# Patient Record
Sex: FEMALE | Race: WHITE | NOT HISPANIC OR LATINO | Employment: OTHER | URBAN - METROPOLITAN AREA
[De-identification: names, ages, dates, MRNs, and addresses within clinical notes are randomized per-mention and may not be internally consistent; named-entity substitution may affect disease eponyms.]

---

## 2019-04-01 ENCOUNTER — TELEPHONE (OUTPATIENT)
Dept: NEUROSURGERY | Facility: CLINIC | Age: 69
End: 2019-04-01

## 2019-04-01 NOTE — TELEPHONE ENCOUNTER
----- Message from Karly Spring sent at 4/1/2019  1:53 PM CDT -----  Contact: self @ [phone]  Pt says she had back surgery with Dr Gilmore back in the 90's at [facility].  Pt is calling to schedule.  Would like to know if he will still see her for her back even though he is only seeing brain now.  Pls call.

## 2019-06-03 ENCOUNTER — OFFICE VISIT (OUTPATIENT)
Dept: NEUROSURGERY | Facility: CLINIC | Age: 69
End: 2019-06-03
Payer: MEDICARE

## 2019-06-03 VITALS
SYSTOLIC BLOOD PRESSURE: 136 MMHG | TEMPERATURE: 98 F | WEIGHT: 246 LBS | DIASTOLIC BLOOD PRESSURE: 60 MMHG | HEART RATE: 63 BPM

## 2019-06-03 DIAGNOSIS — M96.1 LUMBAR POST-LAMINECTOMY SYNDROME: ICD-10-CM

## 2019-06-03 DIAGNOSIS — G89.4 CHRONIC PAIN SYNDROME: ICD-10-CM

## 2019-06-03 PROCEDURE — 99204 OFFICE O/P NEW MOD 45 MIN: CPT | Mod: S$PBB,,, | Performed by: NEUROLOGICAL SURGERY

## 2019-06-03 PROCEDURE — 99213 OFFICE O/P EST LOW 20 MIN: CPT | Mod: PBBFAC | Performed by: NEUROLOGICAL SURGERY

## 2019-06-03 PROCEDURE — 99999 PR PBB SHADOW E&M-EST. PATIENT-LVL III: ICD-10-PCS | Mod: PBBFAC,,, | Performed by: NEUROLOGICAL SURGERY

## 2019-06-03 PROCEDURE — 99999 PR PBB SHADOW E&M-EST. PATIENT-LVL III: CPT | Mod: PBBFAC,,, | Performed by: NEUROLOGICAL SURGERY

## 2019-06-03 PROCEDURE — 99204 PR OFFICE/OUTPT VISIT, NEW, LEVL IV, 45-59 MIN: ICD-10-PCS | Mod: S$PBB,,, | Performed by: NEUROLOGICAL SURGERY

## 2019-06-03 RX ORDER — ATORVASTATIN CALCIUM 20 MG/1
20 TABLET, FILM COATED ORAL DAILY
Refills: 1 | COMMUNITY
Start: 2019-05-19 | End: 2019-07-26

## 2019-06-03 RX ORDER — MIRABEGRON 50 MG/1
TABLET, FILM COATED, EXTENDED RELEASE ORAL
Refills: 1 | COMMUNITY
Start: 2019-04-01 | End: 2019-07-26

## 2019-06-03 RX ORDER — METOPROLOL SUCCINATE 50 MG/1
50 TABLET, EXTENDED RELEASE ORAL DAILY
Refills: 1 | COMMUNITY
Start: 2019-05-01 | End: 2019-07-26

## 2019-06-03 RX ORDER — OXYCODONE AND ACETAMINOPHEN 10; 325 MG/1; MG/1
1 TABLET ORAL 4 TIMES DAILY PRN
Refills: 0 | COMMUNITY
Start: 2019-04-01 | End: 2019-10-14

## 2019-06-03 RX ORDER — MONTELUKAST SODIUM 10 MG/1
10 TABLET ORAL DAILY
Refills: 1 | COMMUNITY
Start: 2019-04-19 | End: 2019-07-26

## 2019-06-03 RX ORDER — TRIAMCINOLONE ACETONIDE 1 MG/G
CREAM TOPICAL
COMMUNITY
Start: 2016-01-16 | End: 2019-06-17

## 2019-06-03 RX ORDER — SULFAMETHOXAZOLE AND TRIMETHOPRIM 800; 160 MG/1; MG/1
TABLET ORAL
COMMUNITY
End: 2019-06-17

## 2019-06-03 RX ORDER — LORATADINE 10 MG/1
10 TABLET ORAL DAILY
Refills: 0 | COMMUNITY
Start: 2019-03-21 | End: 2019-06-17

## 2019-06-03 RX ORDER — MIRTAZAPINE 15 MG/1
15 TABLET, FILM COATED ORAL DAILY
Refills: 2 | COMMUNITY
Start: 2019-02-24 | End: 2019-06-03 | Stop reason: SDUPTHER

## 2019-06-03 RX ORDER — LEVOTHYROXINE SODIUM 100 UG/1
100 TABLET ORAL DAILY
Refills: 1 | COMMUNITY
Start: 2019-05-10 | End: 2019-07-26

## 2019-06-03 RX ORDER — LISINOPRIL 10 MG/1
20 TABLET ORAL DAILY
Refills: 1 | COMMUNITY
Start: 2019-04-19 | End: 2019-07-26

## 2019-06-03 RX ORDER — RIZATRIPTAN BENZOATE 10 MG/1
TABLET ORAL
COMMUNITY
End: 2019-06-17

## 2019-06-03 RX ORDER — OXYBUTYNIN CHLORIDE 10 MG/1
10 TABLET, EXTENDED RELEASE ORAL DAILY
Refills: 0 | COMMUNITY
Start: 2019-05-13 | End: 2019-06-17

## 2019-06-03 RX ORDER — AZELASTINE 1 MG/ML
1 SPRAY, METERED NASAL DAILY
COMMUNITY
Start: 2019-06-01 | End: 2019-07-26

## 2019-06-03 RX ORDER — PANTOPRAZOLE SODIUM 40 MG/1
40 TABLET, DELAYED RELEASE ORAL DAILY
Refills: 1 | COMMUNITY
Start: 2019-05-16 | End: 2019-07-26

## 2019-06-03 RX ORDER — MIRTAZAPINE 15 MG/1
TABLET, FILM COATED ORAL
COMMUNITY
End: 2019-06-17

## 2019-06-03 RX ORDER — SOLIFENACIN SUCCINATE 10 MG/1
5 TABLET, FILM COATED ORAL
COMMUNITY
End: 2019-06-17

## 2019-06-03 RX ORDER — FLUTICASONE PROPIONATE 50 MCG
2 SPRAY, SUSPENSION (ML) NASAL NIGHTLY
COMMUNITY
Start: 2019-06-02 | End: 2019-07-26

## 2019-06-03 RX ORDER — FUROSEMIDE 20 MG/1
20 TABLET ORAL DAILY
COMMUNITY
Start: 2015-10-30

## 2019-06-03 NOTE — H&P (VIEW-ONLY)
History & Physical    SUBJECTIVE:     Chief Complaint:  Chronic back pain and leg pain.    History of Present Illness:  Ms. Mccabe is a 68-year-old female who was referred to me by Dr. Juan Manuel Mcconnell.  Her past medical history is significant for arthritis, hyperlipidemia, and hypertension.  She complains of a longstanding history of low back pain and leg pain.  Her back pain is equal to her leg pain.  Her leg pain goes down the posterior aspect of her legs bilaterally.  Many years ago she had an intrathecal catheter and pain pump placed for this chronic pain.  She states that her pain has been well controlled with the pain pump for many years.  She has undergone 2 revisions of the pump.  This 2nd revision was in July of 2016.  During this revision, the Medtronic intrathecal pump was replaced with a Flowonix pump.  The patient states that since the new pump was placed she has never had as good pain relief and that the pump never worked right.  In October of 2008 she had withdrawal symptoms and after with the withdrawal symptoms resolved she noticed that she no longer had any pain relief at all.  She is here today to discuss pump revision and/or replacement.    Review of patient's allergies indicates:   Allergen Reactions    Morphine Anaphylaxis    Morpholine analogues Itching, Nausea And Vomiting, Nausea Only, Palpitations, Shortness Of Breath and Swelling    Codeine        Current Outpatient Medications   Medication Sig Dispense Refill    atorvastatin (LIPITOR) 20 MG tablet Take 20 mg by mouth once daily.  1    azelastine (ASTELIN) 137 mcg (0.1 %) nasal spray       fluticasone propionate (FLONASE) 50 mcg/actuation nasal spray       furosemide (LASIX) 20 MG tablet       levothyroxine (SYNTHROID) 100 MCG tablet Take 100 mcg by mouth once daily.  1    lisinopril 10 MG tablet Take 20 mg by mouth once daily.  1    loratadine (CLARITIN) 10 mg tablet Take 10 mg by mouth once daily.  0    metoprolol succinate  (TOPROL-XL) 50 MG 24 hr tablet Take 50 mg by mouth once daily.  1    mirtazapine (REMERON) 15 MG tablet Remeron 15 mg tablet   Take 1 tablet every day by oral route at bedtime for 30 days.      montelukast (SINGULAIR) 10 mg tablet Take 10 mg by mouth once daily.  1    MYRBETRIQ 50 mg Tb24 TAKE 1 TABLET BY MOUTH SWALLOWING THE WHOLE TABLET WITH WATER ONCE DAILY  1    oxybutynin (DITROPAN-XL) 10 MG 24 hr tablet Take 10 mg by mouth once daily.  0    oxyCODONE-acetaminophen (PERCOCET)  mg per tablet Take 1 tablet by mouth 4 (four) times daily as needed.  0    pantoprazole (PROTONIX) 40 MG tablet Take 40 mg by mouth once daily.  1    rizatriptan (MAXALT) 10 MG tablet rizatriptan 10 mg tablet      solifenacin (VESICARE) 10 MG tablet Take 5 mg by mouth.      sulfamethoxazole-trimethoprim 800-160mg (BACTRIM DS) 800-160 mg Tab sulfamethoxazole 800 mg-trimethoprim 160 mg tablet      triamcinolone acetonide 0.1% (KENALOG) 0.1 % cream        No current facility-administered medications for this visit.        Past Medical History:   Diagnosis Date    Arthritis     Hyperlipidemia     Hypertension      Past Surgical History:   Procedure Laterality Date    CHOLECYSTECTOMY      GASTRIC RESTRICTION SURGERY      HYSTERECTOMY      KNEE SURGERY Right     neck fusion       SPINE SURGERY      TONSILLECTOMY       Family History     None        Social History     Socioeconomic History    Marital status:      Spouse name: Not on file    Number of children: Not on file    Years of education: Not on file    Highest education level: Not on file   Occupational History    Not on file   Social Needs    Financial resource strain: Not on file    Food insecurity:     Worry: Not on file     Inability: Not on file    Transportation needs:     Medical: Not on file     Non-medical: Not on file   Tobacco Use    Smoking status: Never Smoker    Smokeless tobacco: Never Used   Substance and Sexual Activity     Alcohol use: Never     Frequency: Never    Drug use: Never    Sexual activity: Not Currently   Lifestyle    Physical activity:     Days per week: Not on file     Minutes per session: Not on file    Stress: Not on file   Relationships    Social connections:     Talks on phone: Not on file     Gets together: Not on file     Attends Jainism service: Not on file     Active member of club or organization: Not on file     Attends meetings of clubs or organizations: Not on file     Relationship status: Not on file   Other Topics Concern    Not on file   Social History Narrative    Not on file       Review of Systems:  Review of Systems   Constitutional: Positive for diaphoresis, fatigue and unexpected weight change. Negative for activity change and fever.   HENT: Positive for tinnitus. Negative for hearing loss, nosebleeds and trouble swallowing.    Eyes: Positive for visual disturbance.   Respiratory: Positive for shortness of breath. Negative for cough and wheezing.    Cardiovascular: Positive for palpitations. Negative for chest pain.   Gastrointestinal: Positive for constipation. Negative for abdominal distention, abdominal pain, blood in stool, diarrhea, nausea and vomiting.   Endocrine: Negative for cold intolerance, heat intolerance and polydipsia.   Genitourinary: Negative for difficulty urinating, dysuria, frequency and urgency.   Musculoskeletal: Positive for back pain. Negative for gait problem, joint swelling, myalgias, neck pain and neck stiffness.   Skin: Negative for color change, rash and wound.   Allergic/Immunologic: Negative for environmental allergies and food allergies.   Neurological: Positive for numbness and headaches. Negative for dizziness, seizures, facial asymmetry, speech difficulty, weakness and light-headedness.   Hematological: Bruises/bleeds easily.   Psychiatric/Behavioral: Negative for agitation, behavioral problems, dysphoric mood and hallucinations. The patient is not  nervous/anxious.        OBJECTIVE:     Vital Signs  Temp: 97.6 °F (36.4 °C)  Pulse: 63  BP: 136/60  Pain Score:   6  Weight: 111.6 kg (246 lb)  There is no height or weight on file to calculate BMI.      Physical Exam:  Physical Exam:  Vitals reviewed.    Constitutional: She appears well-developed and well-nourished. No distress.     Eyes: Pupils are equal, round, and reactive to light. Conjunctivae and EOM are normal.     Cardiovascular: Normal rate, regular rhythm, normal pulses and no edema.     Abdominal: Soft. Bowel sounds are normal.     Skin: Skin displays no rash on trunk and no rash on extremities. Skin displays no lesions on trunk and no lesions on extremities.     Psych/Behavior: She is alert. She is oriented to person, place, and time. She has a normal mood and affect.     Musculoskeletal: Gait is normal.        Neck: Range of motion is full. There is no tenderness. Muscle strength is 5/5. Tone is normal.        Back: Range of motion is full. There is no tenderness. Muscle strength is 5/5. Tone is normal.        Right Upper Extremities: Range of motion is full. There is no tenderness. Muscle strength is 5/5. Tone is normal.        Left Upper Extremities: Range of motion is full. There is no tenderness. Muscle strength is 5/5. Tone is normal.       Right Lower Extremities: Range of motion is full. There is no tenderness. Muscle strength is 5/5. Tone is normal.        Left Lower Extremities: Range of motion is full. There is no tenderness. Muscle strength is 5/5. Tone is normal.     Neurological:        Coordination: She has a normal Romberg Test, normal finger to nose coordination and normal tandem walking coordination.        Sensory: There is no sensory deficit in the trunk. There is no sensory deficit in the extremities.        DTRs: DTRs are DTRS NORMAL AND SYMMETRICnormal and symmetric. She displays no Babinski's sign on the right side. She displays no Babinski's sign on the left side.        Cranial  nerves: Cranial nerve(s) II, III, IV, V, VI, VII, VIII, IX, X, XI and XII are intact.         Diagnostic Results:  She has no updated imaging studies available for review.    ASSESSMENT/PLAN:     Ms. Mccabe is a 68-year-old female with chronic pain.  She had an intrathecal catheter and pump placed many years ago for the pain.  She was doing well until July 2016 when her Medtronic pump was replaced with a Flowonix pump.  This pump never worked as well as the Medtronic pump.  Furthermore, it appears that she had a catheter issue in October of 2018 where the catheter is no longer functional.  She went through a period of withdrawal and now the pump does not work for her at all.  My plan is to replace the entire intrathecal catheter and pump system.  We will switch back to a Medtronic pump.  We will stay with a 20 mL pump given the fact that her drug is compound in needs to be changed every 3 months.  She also complains that the pump is physically to high in her abdomen.  We will plan revising the pump pocket and moving the pump more caudally.  Explained the procedure in detail to the patient as well as the risks and benefits and present cons.  The patient wishes to proceed at this time.  We will get all the appropriate preoperative testing and schedule surgery in the near future.  She knows she can call with any further questions or concerns in the meantime.        Note dictated with voice recognition software, please excuse any grammatical errors.

## 2019-06-03 NOTE — PROGRESS NOTES
History & Physical    SUBJECTIVE:     Chief Complaint:  Chronic back pain and leg pain.    History of Present Illness:  Ms. Mccabe is a 68-year-old female who was referred to me by Dr. Juan Manuel Mcconnell.  Her past medical history is significant for arthritis, hyperlipidemia, and hypertension.  She complains of a longstanding history of low back pain and leg pain.  Her back pain is equal to her leg pain.  Her leg pain goes down the posterior aspect of her legs bilaterally.  Many years ago she had an intrathecal catheter and pain pump placed for this chronic pain.  She states that her pain has been well controlled with the pain pump for many years.  She has undergone 2 revisions of the pump.  This 2nd revision was in July of 2016.  During this revision, the Medtronic intrathecal pump was replaced with a Flowonix pump.  The patient states that since the new pump was placed she has never had as good pain relief and that the pump never worked right.  In October of 2008 she had withdrawal symptoms and after with the withdrawal symptoms resolved she noticed that she no longer had any pain relief at all.  She is here today to discuss pump revision and/or replacement.    Review of patient's allergies indicates:   Allergen Reactions    Morphine Anaphylaxis    Morpholine analogues Itching, Nausea And Vomiting, Nausea Only, Palpitations, Shortness Of Breath and Swelling    Codeine        Current Outpatient Medications   Medication Sig Dispense Refill    atorvastatin (LIPITOR) 20 MG tablet Take 20 mg by mouth once daily.  1    azelastine (ASTELIN) 137 mcg (0.1 %) nasal spray       fluticasone propionate (FLONASE) 50 mcg/actuation nasal spray       furosemide (LASIX) 20 MG tablet       levothyroxine (SYNTHROID) 100 MCG tablet Take 100 mcg by mouth once daily.  1    lisinopril 10 MG tablet Take 20 mg by mouth once daily.  1    loratadine (CLARITIN) 10 mg tablet Take 10 mg by mouth once daily.  0    metoprolol succinate  (TOPROL-XL) 50 MG 24 hr tablet Take 50 mg by mouth once daily.  1    mirtazapine (REMERON) 15 MG tablet Remeron 15 mg tablet   Take 1 tablet every day by oral route at bedtime for 30 days.      montelukast (SINGULAIR) 10 mg tablet Take 10 mg by mouth once daily.  1    MYRBETRIQ 50 mg Tb24 TAKE 1 TABLET BY MOUTH SWALLOWING THE WHOLE TABLET WITH WATER ONCE DAILY  1    oxybutynin (DITROPAN-XL) 10 MG 24 hr tablet Take 10 mg by mouth once daily.  0    oxyCODONE-acetaminophen (PERCOCET)  mg per tablet Take 1 tablet by mouth 4 (four) times daily as needed.  0    pantoprazole (PROTONIX) 40 MG tablet Take 40 mg by mouth once daily.  1    rizatriptan (MAXALT) 10 MG tablet rizatriptan 10 mg tablet      solifenacin (VESICARE) 10 MG tablet Take 5 mg by mouth.      sulfamethoxazole-trimethoprim 800-160mg (BACTRIM DS) 800-160 mg Tab sulfamethoxazole 800 mg-trimethoprim 160 mg tablet      triamcinolone acetonide 0.1% (KENALOG) 0.1 % cream        No current facility-administered medications for this visit.        Past Medical History:   Diagnosis Date    Arthritis     Hyperlipidemia     Hypertension      Past Surgical History:   Procedure Laterality Date    CHOLECYSTECTOMY      GASTRIC RESTRICTION SURGERY      HYSTERECTOMY      KNEE SURGERY Right     neck fusion       SPINE SURGERY      TONSILLECTOMY       Family History     None        Social History     Socioeconomic History    Marital status:      Spouse name: Not on file    Number of children: Not on file    Years of education: Not on file    Highest education level: Not on file   Occupational History    Not on file   Social Needs    Financial resource strain: Not on file    Food insecurity:     Worry: Not on file     Inability: Not on file    Transportation needs:     Medical: Not on file     Non-medical: Not on file   Tobacco Use    Smoking status: Never Smoker    Smokeless tobacco: Never Used   Substance and Sexual Activity     Alcohol use: Never     Frequency: Never    Drug use: Never    Sexual activity: Not Currently   Lifestyle    Physical activity:     Days per week: Not on file     Minutes per session: Not on file    Stress: Not on file   Relationships    Social connections:     Talks on phone: Not on file     Gets together: Not on file     Attends Yazidi service: Not on file     Active member of club or organization: Not on file     Attends meetings of clubs or organizations: Not on file     Relationship status: Not on file   Other Topics Concern    Not on file   Social History Narrative    Not on file       Review of Systems:  Review of Systems   Constitutional: Positive for diaphoresis, fatigue and unexpected weight change. Negative for activity change and fever.   HENT: Positive for tinnitus. Negative for hearing loss, nosebleeds and trouble swallowing.    Eyes: Positive for visual disturbance.   Respiratory: Positive for shortness of breath. Negative for cough and wheezing.    Cardiovascular: Positive for palpitations. Negative for chest pain.   Gastrointestinal: Positive for constipation. Negative for abdominal distention, abdominal pain, blood in stool, diarrhea, nausea and vomiting.   Endocrine: Negative for cold intolerance, heat intolerance and polydipsia.   Genitourinary: Negative for difficulty urinating, dysuria, frequency and urgency.   Musculoskeletal: Positive for back pain. Negative for gait problem, joint swelling, myalgias, neck pain and neck stiffness.   Skin: Negative for color change, rash and wound.   Allergic/Immunologic: Negative for environmental allergies and food allergies.   Neurological: Positive for numbness and headaches. Negative for dizziness, seizures, facial asymmetry, speech difficulty, weakness and light-headedness.   Hematological: Bruises/bleeds easily.   Psychiatric/Behavioral: Negative for agitation, behavioral problems, dysphoric mood and hallucinations. The patient is not  nervous/anxious.        OBJECTIVE:     Vital Signs  Temp: 97.6 °F (36.4 °C)  Pulse: 63  BP: 136/60  Pain Score:   6  Weight: 111.6 kg (246 lb)  There is no height or weight on file to calculate BMI.      Physical Exam:  Physical Exam:  Vitals reviewed.    Constitutional: She appears well-developed and well-nourished. No distress.     Eyes: Pupils are equal, round, and reactive to light. Conjunctivae and EOM are normal.     Cardiovascular: Normal rate, regular rhythm, normal pulses and no edema.     Abdominal: Soft. Bowel sounds are normal.     Skin: Skin displays no rash on trunk and no rash on extremities. Skin displays no lesions on trunk and no lesions on extremities.     Psych/Behavior: She is alert. She is oriented to person, place, and time. She has a normal mood and affect.     Musculoskeletal: Gait is normal.        Neck: Range of motion is full. There is no tenderness. Muscle strength is 5/5. Tone is normal.        Back: Range of motion is full. There is no tenderness. Muscle strength is 5/5. Tone is normal.        Right Upper Extremities: Range of motion is full. There is no tenderness. Muscle strength is 5/5. Tone is normal.        Left Upper Extremities: Range of motion is full. There is no tenderness. Muscle strength is 5/5. Tone is normal.       Right Lower Extremities: Range of motion is full. There is no tenderness. Muscle strength is 5/5. Tone is normal.        Left Lower Extremities: Range of motion is full. There is no tenderness. Muscle strength is 5/5. Tone is normal.     Neurological:        Coordination: She has a normal Romberg Test, normal finger to nose coordination and normal tandem walking coordination.        Sensory: There is no sensory deficit in the trunk. There is no sensory deficit in the extremities.        DTRs: DTRs are DTRS NORMAL AND SYMMETRICnormal and symmetric. She displays no Babinski's sign on the right side. She displays no Babinski's sign on the left side.        Cranial  nerves: Cranial nerve(s) II, III, IV, V, VI, VII, VIII, IX, X, XI and XII are intact.         Diagnostic Results:  She has no updated imaging studies available for review.    ASSESSMENT/PLAN:     Ms. Mccabe is a 68-year-old female with chronic pain.  She had an intrathecal catheter and pump placed many years ago for the pain.  She was doing well until July 2016 when her Medtronic pump was replaced with a Flowonix pump.  This pump never worked as well as the Medtronic pump.  Furthermore, it appears that she had a catheter issue in October of 2018 where the catheter is no longer functional.  She went through a period of withdrawal and now the pump does not work for her at all.  My plan is to replace the entire intrathecal catheter and pump system.  We will switch back to a Medtronic pump.  We will stay with a 20 mL pump given the fact that her drug is compound in needs to be changed every 3 months.  She also complains that the pump is physically to high in her abdomen.  We will plan revising the pump pocket and moving the pump more caudally.  Explained the procedure in detail to the patient as well as the risks and benefits and present cons.  The patient wishes to proceed at this time.  We will get all the appropriate preoperative testing and schedule surgery in the near future.  She knows she can call with any further questions or concerns in the meantime.        Note dictated with voice recognition software, please excuse any grammatical errors.

## 2019-06-05 ENCOUNTER — TELEPHONE (OUTPATIENT)
Dept: NEUROSURGERY | Facility: CLINIC | Age: 69
End: 2019-06-05

## 2019-06-05 DIAGNOSIS — M96.1 POST-LAMINECTOMY SYNDROME: ICD-10-CM

## 2019-06-05 DIAGNOSIS — G89.4 CHRONIC PAIN SYNDROME: Primary | ICD-10-CM

## 2019-06-07 ENCOUNTER — PATIENT MESSAGE (OUTPATIENT)
Dept: SURGERY | Facility: HOSPITAL | Age: 69
End: 2019-06-07

## 2019-06-12 ENCOUNTER — TELEPHONE (OUTPATIENT)
Dept: PAIN MEDICINE | Facility: CLINIC | Age: 69
End: 2019-06-12

## 2019-06-12 NOTE — TELEPHONE ENCOUNTER
----- Message from Faisal Banerjee sent at 6/12/2019 11:55 AM CDT -----  Contact: self   Patient want to know if Dr sees pain pump patients, please call back at [phone] (home)

## 2019-06-12 NOTE — TELEPHONE ENCOUNTER
Spoke with patient and informed her we do not manage pain pumps. Sent her an e-mail of other doctors in the area to try.

## 2019-06-17 ENCOUNTER — TELEPHONE (OUTPATIENT)
Dept: NEUROSURGERY | Facility: CLINIC | Age: 69
End: 2019-06-17

## 2019-06-17 ENCOUNTER — ANESTHESIA EVENT (OUTPATIENT)
Dept: SURGERY | Facility: HOSPITAL | Age: 69
DRG: 042 | End: 2019-06-17
Payer: MEDICARE

## 2019-06-17 RX ORDER — ASPIRIN 81 MG/1
81 TABLET ORAL DAILY
Status: ON HOLD | COMMUNITY
End: 2019-06-18 | Stop reason: HOSPADM

## 2019-06-17 NOTE — PRE-PROCEDURE INSTRUCTIONS
Preop instructions: NPO solids/ milk products after midnight and clears up to 4:30am  (clear liquids are: water, apple juice, Gatorade & Jell-O, black coffee/no milk, cream or creamer), shower instructions, directions, leave all valuables at home, medication instructions for PM prior & am of procedure explained. Patient stated an understanding.     Patient denies any side effects or issues with anesthesia or sedation.

## 2019-06-17 NOTE — TELEPHONE ENCOUNTER
----- Message from Cristofer Ash sent at 6/17/2019  8:07 AM CDT -----  Contact: Patient @ [phone]  Patient calling to confirm if all pre op information and surgerial clearance were received,  pls call

## 2019-06-17 NOTE — ANESTHESIA PREPROCEDURE EVALUATION
"                                                                                                             06/17/2019  [facility]  Anesthesia Pre-Operative Evaluation         Patient Name: Karen Mccabe  YOB: 1950  MRN: [id number]    SUBJECTIVE:     Pre-operative evaluation for Procedure(s) (LRB):  Insertion, Intrathecal Pump (N/A)     06/17/2019    Karen Mccabe is a 68 y.o. female w/ a significant PMHx of HTN, HLD, GERD, arthritis, lumbar post laminectomy syndrome and chronic pain syndrome.    Patient now presents for the above procedure(s).      LDA: None documented.       Prev airway: None documented.    Drips: None documented.      Patient Active Problem List   Diagnosis    Chronic pain syndrome    Lumbar post-laminectomy syndrome       Review of patient's allergies indicates:   Allergen Reactions    Morphine Anaphylaxis    Versed [midazolam] Hallucinations     "went crazy"    Codeine Hives and Rash       Current Inpatient Medications:      No current facility-administered medications on file prior to encounter.      Current Outpatient Medications on File Prior to Encounter   Medication Sig Dispense Refill    aspirin (ECOTRIN) 81 MG EC tablet Take 81 mg by mouth once daily.      atorvastatin (LIPITOR) 20 MG tablet Take 20 mg by mouth once daily.  1    azelastine (ASTELIN) 137 mcg (0.1 %) nasal spray 1 spray by Nasal route once daily.       fluticasone propionate (FLONASE) 50 mcg/actuation nasal spray 2 sprays every evening.       furosemide (LASIX) 20 MG tablet Take 20 mg by mouth once daily.       levothyroxine (SYNTHROID) 100 MCG tablet Take 100 mcg by mouth once daily.  1    lisinopril 10 MG tablet Take 20 mg by mouth once daily.  1    metoprolol succinate (TOPROL-XL) 50 MG 24 hr tablet Take 50 mg by mouth once daily.  1    montelukast (SINGULAIR) 10 mg tablet Take 10 mg by mouth once daily.  1    MYRBETRIQ 50 mg Tb24 TAKE 1 TABLET BY " MOUTH SWALLOWING THE WHOLE TABLET WITH WATER ONCE DAILY  1    omega-3/dha/epa/dpa/fish oil (OMEGA-3 2100 ORAL) Take by mouth once daily.      oxyCODONE-acetaminophen (PERCOCET)  mg per tablet Take 1 tablet by mouth 4 (four) times daily as needed.  0    pantoprazole (PROTONIX) 40 MG tablet Take 40 mg by mouth once daily.  1       Past Surgical History:   Procedure Laterality Date    CHOLECYSTECTOMY      GASTRIC RESTRICTION SURGERY      HYSTERECTOMY      KNEE SURGERY Right     neck fusion       SPINE SURGERY      TONSILLECTOMY         Social History     Socioeconomic History    Marital status:      Spouse name: Not on file    Number of children: Not on file    Years of education: Not on file    Highest education level: Not on file   Occupational History    Not on file   Social Needs    Financial resource strain: Not on file    Food insecurity:     Worry: Not on file     Inability: Not on file    Transportation needs:     Medical: Not on file     Non-medical: Not on file   Tobacco Use    Smoking status: Never Smoker    Smokeless tobacco: Never Used   Substance and Sexual Activity    Alcohol use: Never     Frequency: Never    Drug use: Never    Sexual activity: Not Currently   Lifestyle    Physical activity:     Days per week: Not on file     Minutes per session: Not on file    Stress: Not on file   Relationships    Social connections:     Talks on phone: Not on file     Gets together: Not on file     Attends [employer] service: Not on file     Active member of club or organization: Not on file     Attends meetings of clubs or organizations: Not on file     Relationship status: Not on file   Other Topics Concern    Not on file   Social History Narrative    Not on file       OBJECTIVE:     Vital Signs Range (Last 24H):         Significant Labs:  No results found for: WBC, HGB, HCT, PLT, CHOL, TRIG, HDL, LDLDIRECT, ALT, AST, NA, K, CL, CREATININE, BUN, CO2, TSH, PSA, INR, GLUF,  HGBA1C, MICROALBUR    Diagnostic Studies: No relevant studies.    EKG: No recent studies available.    2D ECHO:  No results found for this or any previous visit.      ASSESSMENT/PLAN:         Anesthesia Evaluation    I have reviewed the Patient Summary Reports.    I have reviewed the Nursing Notes.      Review of Systems  Anesthesia Hx:  History of prior surgery of interest to airway management or planning: Denies Family Hx of Anesthesia complications.   Denies Personal Hx of Anesthesia complications.   Social:  Non-Smoker    Cardiovascular:   Hypertension Denies MI.  Denies CAD.       Pulmonary:   Denies COPD.  Denies Asthma.    Hepatic/GI:   GERD    Musculoskeletal:   Arthritis   Spine Disorders: lumbar Chronic Pain    Neurological:   Denies CVA. Denies Seizures.    Endocrine:   Hypothyroidism        Physical Exam  General:  Well nourished       Chest/Lungs:  Chest/Lungs Clear    Heart/Vascular:  Heart Findings: Normal Heart murmur: negative       Mental Status:  Mental Status Findings: Normal        Anesthesia Plan  Type of Anesthesia, risks & benefits discussed:  Anesthesia Type:  general  Patient's Preference:   Intra-op Monitoring Plan: standard ASA monitors  Intra-op Monitoring Plan Comments:   Post Op Pain Control Plan: multimodal analgesia, IV/PO Opioids PRN and per primary service following discharge from PACU  Post Op Pain Control Plan Comments:   Induction:   IV  Beta Blocker:  Patient is not currently on a Beta-Blocker (No further documentation required).       Informed Consent: Patient understands risks and agrees with Anesthesia plan.  Questions answered. Anesthesia consent signed with patient.  ASA Score: 2     Day of Surgery Review of History & Physical: I have interviewed and examined the patient. I have reviewed the patient's H&P dated:    H&P update referred to the provider.         Ready For Surgery From Anesthesia Perspective.

## 2019-06-18 ENCOUNTER — ANESTHESIA (OUTPATIENT)
Dept: SURGERY | Facility: HOSPITAL | Age: 69
DRG: 042 | End: 2019-06-18
Payer: MEDICARE

## 2019-06-18 ENCOUNTER — HOSPITAL ENCOUNTER (INPATIENT)
Facility: HOSPITAL | Age: 69
LOS: 1 days | Discharge: HOME | DRG: 042 | End: 2019-06-18
Attending: NEUROLOGICAL SURGERY | Admitting: NEUROLOGICAL SURGERY
Payer: MEDICARE

## 2019-06-18 DIAGNOSIS — G89.4 CHRONIC PAIN SYNDROME: ICD-10-CM

## 2019-06-18 DIAGNOSIS — M96.1 LUMBAR POST-LAMINECTOMY SYNDROME: Primary | ICD-10-CM

## 2019-06-18 LAB
ABO + RH BLD: NORMAL
ALBUMIN SERPL BCP-MCNC: 3.3 G/DL (ref 3.5–5.2)
ALP SERPL-CCNC: 109 U/L (ref 55–135)
ALT SERPL W/O P-5'-P-CCNC: 32 U/L (ref 10–44)
ANION GAP SERPL CALC-SCNC: 9 MMOL/L (ref 8–16)
APTT BLDCRRT: 24.9 SEC (ref 21–32)
AST SERPL-CCNC: 35 U/L (ref 10–40)
BASOPHILS # BLD AUTO: 0.06 K/UL (ref 0–0.2)
BASOPHILS NFR BLD: 0.8 % (ref 0–1.9)
BILIRUB SERPL-MCNC: 0.7 MG/DL (ref 0.1–1)
BLD GP AB SCN CELLS X3 SERPL QL: NORMAL
BUN SERPL-MCNC: 12 MG/DL (ref 8–23)
CALCIUM SERPL-MCNC: 9.7 MG/DL (ref 8.7–10.5)
CHLORIDE SERPL-SCNC: 104 MMOL/L (ref 95–110)
CO2 SERPL-SCNC: 28 MMOL/L (ref 23–29)
CREAT SERPL-MCNC: 0.8 MG/DL (ref 0.5–1.4)
DIFFERENTIAL METHOD: NORMAL
EOSINOPHIL # BLD AUTO: 0.1 K/UL (ref 0–0.5)
EOSINOPHIL NFR BLD: 1.7 % (ref 0–8)
ERYTHROCYTE [DISTWIDTH] IN BLOOD BY AUTOMATED COUNT: 13.6 % (ref 11.5–14.5)
EST. GFR  (AFRICAN AMERICAN): >60 ML/MIN/1.73 M^2
EST. GFR  (NON AFRICAN AMERICAN): >60 ML/MIN/1.73 M^2
GLUCOSE SERPL-MCNC: 108 MG/DL (ref 70–110)
HBV SURFACE AB SER-ACNC: NEGATIVE M[IU]/ML
HBV SURFACE AG SERPL QL IA: NEGATIVE
HCT VFR BLD AUTO: 42.4 % (ref 37–48.5)
HGB BLD-MCNC: 13.7 G/DL (ref 12–16)
HIV 1+2 AB+HIV1 P24 AG SERPL QL IA: NEGATIVE
HIV1+2 IGG SERPL QL IA.RAPID: NEGATIVE
IMM GRANULOCYTES # BLD AUTO: 0.02 K/UL (ref 0–0.04)
IMM GRANULOCYTES NFR BLD AUTO: 0.3 % (ref 0–0.5)
INR PPP: 1.1 (ref 0.8–1.2)
LYMPHOCYTES # BLD AUTO: 2 K/UL (ref 1–4.8)
LYMPHOCYTES NFR BLD: 26.5 % (ref 18–48)
MCH RBC QN AUTO: 29.7 PG (ref 27–31)
MCHC RBC AUTO-ENTMCNC: 32.3 G/DL (ref 32–36)
MCV RBC AUTO: 92 FL (ref 82–98)
MONOCYTES # BLD AUTO: 0.9 K/UL (ref 0.3–1)
MONOCYTES NFR BLD: 11.6 % (ref 4–15)
NEUTROPHILS # BLD AUTO: 4.5 K/UL (ref 1.8–7.7)
NEUTROPHILS NFR BLD: 59.1 % (ref 38–73)
NRBC BLD-RTO: 0 /100 WBC
PLATELET # BLD AUTO: 181 K/UL (ref 150–350)
PMV BLD AUTO: 11.8 FL (ref 9.2–12.9)
POTASSIUM SERPL-SCNC: 3.8 MMOL/L (ref 3.5–5.1)
PROT SERPL-MCNC: 7 G/DL (ref 6–8.4)
PROTHROMBIN TIME: 10.8 SEC (ref 9–12.5)
RBC # BLD AUTO: 4.61 M/UL (ref 4–5.4)
SODIUM SERPL-SCNC: 141 MMOL/L (ref 136–145)
WBC # BLD AUTO: 7.61 K/UL (ref 3.9–12.7)

## 2019-06-18 PROCEDURE — 86703 HIV-1/HIV-2 1 RESULT ANTBDY: CPT | Mod: 91

## 2019-06-18 PROCEDURE — 86901 BLOOD TYPING SEROLOGIC RH(D): CPT

## 2019-06-18 PROCEDURE — 62350 PR IMP SPINAL CANAL CATH: ICD-10-PCS | Mod: ,,, | Performed by: NEUROLOGICAL SURGERY

## 2019-06-18 PROCEDURE — 12000002 HC ACUTE/MED SURGE SEMI-PRIVATE ROOM

## 2019-06-18 PROCEDURE — 87536 HIV-1 QUANT&REVRSE TRNSCRPJ: CPT

## 2019-06-18 PROCEDURE — D9220A PRA ANESTHESIA: ICD-10-PCS | Mod: ,,, | Performed by: ANESTHESIOLOGY

## 2019-06-18 PROCEDURE — 85610 PROTHROMBIN TIME: CPT

## 2019-06-18 PROCEDURE — 62350 IMPLANT SPINAL CANAL CATH: CPT | Mod: ,,, | Performed by: NEUROLOGICAL SURGERY

## 2019-06-18 PROCEDURE — 86706 HEP B SURFACE ANTIBODY: CPT

## 2019-06-18 PROCEDURE — 25000003 PHARM REV CODE 250: Performed by: NEUROLOGICAL SURGERY

## 2019-06-18 PROCEDURE — 63600175 PHARM REV CODE 636 W HCPCS: Performed by: NEUROLOGICAL SURGERY

## 2019-06-18 PROCEDURE — 62362 PR INSERT/ REPLACE INFUSN PUMP,PROGRAMMABLE: ICD-10-PCS | Mod: 51,,, | Performed by: NEUROLOGICAL SURGERY

## 2019-06-18 PROCEDURE — C1755 CATHETER, INTRASPINAL: HCPCS | Performed by: NEUROLOGICAL SURGERY

## 2019-06-18 PROCEDURE — 71000033 HC RECOVERY, INTIAL HOUR: Performed by: NEUROLOGICAL SURGERY

## 2019-06-18 PROCEDURE — D9220A PRA ANESTHESIA: Mod: ,,, | Performed by: ANESTHESIOLOGY

## 2019-06-18 PROCEDURE — 85025 COMPLETE CBC W/AUTO DIFF WBC: CPT

## 2019-06-18 PROCEDURE — 87340 HEPATITIS B SURFACE AG IA: CPT

## 2019-06-18 PROCEDURE — C1772 INFUSION PUMP, PROGRAMMABLE: HCPCS | Performed by: NEUROLOGICAL SURGERY

## 2019-06-18 PROCEDURE — 86703 HIV-1/HIV-2 1 RESULT ANTBDY: CPT

## 2019-06-18 PROCEDURE — 63600175 PHARM REV CODE 636 W HCPCS: Performed by: STUDENT IN AN ORGANIZED HEALTH CARE EDUCATION/TRAINING PROGRAM

## 2019-06-18 PROCEDURE — 80053 COMPREHEN METABOLIC PANEL: CPT

## 2019-06-18 PROCEDURE — 37000009 HC ANESTHESIA EA ADD 15 MINS: Performed by: NEUROLOGICAL SURGERY

## 2019-06-18 PROCEDURE — 87522 HEPATITIS C REVRS TRNSCRPJ: CPT

## 2019-06-18 PROCEDURE — 36000706: Performed by: NEUROLOGICAL SURGERY

## 2019-06-18 PROCEDURE — 25000003 PHARM REV CODE 250: Performed by: STUDENT IN AN ORGANIZED HEALTH CARE EDUCATION/TRAINING PROGRAM

## 2019-06-18 PROCEDURE — 37000008 HC ANESTHESIA 1ST 15 MINUTES: Performed by: NEUROLOGICAL SURGERY

## 2019-06-18 PROCEDURE — 62362 IMPLANT SPINE INFUSION PUMP: CPT | Mod: 51,,, | Performed by: NEUROLOGICAL SURGERY

## 2019-06-18 PROCEDURE — 71000039 HC RECOVERY, EACH ADD'L HOUR: Performed by: NEUROLOGICAL SURGERY

## 2019-06-18 PROCEDURE — 36000707: Performed by: NEUROLOGICAL SURGERY

## 2019-06-18 PROCEDURE — 71000015 HC POSTOP RECOV 1ST HR: Performed by: NEUROLOGICAL SURGERY

## 2019-06-18 PROCEDURE — 85730 THROMBOPLASTIN TIME PARTIAL: CPT

## 2019-06-18 PROCEDURE — 27201423 OPTIME MED/SURG SUP & DEVICES STERILE SUPPLY: Performed by: NEUROLOGICAL SURGERY

## 2019-06-18 DEVICE — PUMP PAIN CNTRL INF 40ML: Type: IMPLANTABLE DEVICE | Site: ABDOMEN | Status: FUNCTIONAL

## 2019-06-18 DEVICE — CATH ASCENDA 86.4X114.3 4FR: Type: IMPLANTABLE DEVICE | Site: ABDOMEN | Status: FUNCTIONAL

## 2019-06-18 RX ORDER — IPRATROPIUM BROMIDE AND ALBUTEROL SULFATE 2.5; .5 MG/3ML; MG/3ML
3 SOLUTION RESPIRATORY (INHALATION) EVERY 4 HOURS PRN
Status: DISCONTINUED | OUTPATIENT
Start: 2019-06-18 | End: 2019-06-18 | Stop reason: HOSPADM

## 2019-06-18 RX ORDER — LIDOCAINE HYDROCHLORIDE AND EPINEPHRINE 10; 10 MG/ML; UG/ML
INJECTION, SOLUTION INFILTRATION; PERINEURAL
Status: DISCONTINUED | OUTPATIENT
Start: 2019-06-18 | End: 2019-06-18 | Stop reason: HOSPADM

## 2019-06-18 RX ORDER — SODIUM CHLORIDE 0.9 % (FLUSH) 0.9 %
10 SYRINGE (ML) INJECTION
Status: DISCONTINUED | OUTPATIENT
Start: 2019-06-18 | End: 2019-06-18 | Stop reason: HOSPADM

## 2019-06-18 RX ORDER — ONDANSETRON 2 MG/ML
INJECTION INTRAMUSCULAR; INTRAVENOUS
Status: DISCONTINUED | OUTPATIENT
Start: 2019-06-18 | End: 2019-06-18

## 2019-06-18 RX ORDER — LIDOCAINE HCL/PF 100 MG/5ML
SYRINGE (ML) INTRAVENOUS
Status: DISCONTINUED | OUTPATIENT
Start: 2019-06-18 | End: 2019-06-18

## 2019-06-18 RX ORDER — ROCURONIUM BROMIDE 10 MG/ML
INJECTION, SOLUTION INTRAVENOUS
Status: DISCONTINUED | OUTPATIENT
Start: 2019-06-18 | End: 2019-06-18

## 2019-06-18 RX ORDER — FLUTICASONE PROPIONATE 110 UG/1
1 AEROSOL, METERED RESPIRATORY (INHALATION) 2 TIMES DAILY
COMMUNITY
End: 2019-07-26

## 2019-06-18 RX ORDER — ACETAMINOPHEN 10 MG/ML
INJECTION, SOLUTION INTRAVENOUS
Status: DISCONTINUED | OUTPATIENT
Start: 2019-06-18 | End: 2019-06-18

## 2019-06-18 RX ORDER — CEPHALEXIN 500 MG/1
500 CAPSULE ORAL EVERY 12 HOURS
Qty: 10 CAPSULE | Refills: 0 | Status: SHIPPED | OUTPATIENT
Start: 2019-06-18 | End: 2019-06-23

## 2019-06-18 RX ORDER — FENTANYL CITRATE 50 UG/ML
INJECTION, SOLUTION INTRAMUSCULAR; INTRAVENOUS
Status: DISCONTINUED | OUTPATIENT
Start: 2019-06-18 | End: 2019-06-18

## 2019-06-18 RX ORDER — PROPOFOL 10 MG/ML
VIAL (ML) INTRAVENOUS
Status: DISCONTINUED | OUTPATIENT
Start: 2019-06-18 | End: 2019-06-18

## 2019-06-18 RX ORDER — OXYCODONE AND ACETAMINOPHEN 5; 325 MG/1; MG/1
1 TABLET ORAL EVERY 4 HOURS PRN
Qty: 25 TABLET | Refills: 0 | Status: SHIPPED | OUTPATIENT
Start: 2019-06-18 | End: 2019-06-23

## 2019-06-18 RX ORDER — SODIUM CHLORIDE 9 MG/ML
INJECTION, SOLUTION INTRAVENOUS CONTINUOUS
Status: DISCONTINUED | OUTPATIENT
Start: 2019-06-18 | End: 2019-06-18 | Stop reason: HOSPADM

## 2019-06-18 RX ORDER — ALBUTEROL SULFATE 0.63 MG/3ML
0.63 SOLUTION RESPIRATORY (INHALATION) EVERY 6 HOURS PRN
COMMUNITY
End: 2019-07-26

## 2019-06-18 RX ORDER — BACITRACIN ZINC 500 UNIT/G
OINTMENT (GRAM) TOPICAL
Status: DISCONTINUED | OUTPATIENT
Start: 2019-06-18 | End: 2019-06-18 | Stop reason: HOSPADM

## 2019-06-18 RX ORDER — FENTANYL CITRATE 50 UG/ML
25 INJECTION, SOLUTION INTRAMUSCULAR; INTRAVENOUS EVERY 5 MIN PRN
Status: DISCONTINUED | OUTPATIENT
Start: 2019-06-18 | End: 2019-06-18 | Stop reason: HOSPADM

## 2019-06-18 RX ORDER — CEFAZOLIN SODIUM 1 G/3ML
INJECTION, POWDER, FOR SOLUTION INTRAMUSCULAR; INTRAVENOUS
Status: DISCONTINUED | OUTPATIENT
Start: 2019-06-18 | End: 2019-06-18

## 2019-06-18 RX ORDER — ALBUTEROL SULFATE 2.5 MG/.5ML
2.5 SOLUTION RESPIRATORY (INHALATION) EVERY 4 HOURS PRN
Status: DISCONTINUED | OUTPATIENT
Start: 2019-06-18 | End: 2019-06-18 | Stop reason: HOSPADM

## 2019-06-18 RX ORDER — LIDOCAINE HYDROCHLORIDE 10 MG/ML
INJECTION, SOLUTION EPIDURAL; INFILTRATION; INTRACAUDAL; PERINEURAL
Status: DISCONTINUED | OUTPATIENT
Start: 2019-06-18 | End: 2019-06-18 | Stop reason: HOSPADM

## 2019-06-18 RX ORDER — NEOSTIGMINE METHYLSULFATE 1 MG/ML
INJECTION, SOLUTION INTRAVENOUS
Status: DISCONTINUED | OUTPATIENT
Start: 2019-06-18 | End: 2019-06-18

## 2019-06-18 RX ORDER — KETOROLAC TROMETHAMINE 30 MG/ML
INJECTION, SOLUTION INTRAMUSCULAR; INTRAVENOUS
Status: DISCONTINUED | OUTPATIENT
Start: 2019-06-18 | End: 2019-06-18

## 2019-06-18 RX ORDER — LIDOCAINE HYDROCHLORIDE 10 MG/ML
1 INJECTION, SOLUTION EPIDURAL; INFILTRATION; INTRACAUDAL; PERINEURAL ONCE
Status: COMPLETED | OUTPATIENT
Start: 2019-06-18 | End: 2019-06-18

## 2019-06-18 RX ORDER — SUCCINYLCHOLINE CHLORIDE 20 MG/ML
INJECTION INTRAMUSCULAR; INTRAVENOUS
Status: DISCONTINUED | OUTPATIENT
Start: 2019-06-18 | End: 2019-06-18

## 2019-06-18 RX ORDER — DEXAMETHASONE SODIUM PHOSPHATE 4 MG/ML
INJECTION, SOLUTION INTRA-ARTICULAR; INTRALESIONAL; INTRAMUSCULAR; INTRAVENOUS; SOFT TISSUE
Status: DISCONTINUED | OUTPATIENT
Start: 2019-06-18 | End: 2019-06-18

## 2019-06-18 RX ORDER — PNV NO.95/FERROUS FUM/FOLIC AC 28MG-0.8MG
100 TABLET ORAL DAILY
COMMUNITY

## 2019-06-18 RX ORDER — BACITRACIN 50000 [IU]/1
INJECTION, POWDER, FOR SOLUTION INTRAMUSCULAR
Status: DISCONTINUED | OUTPATIENT
Start: 2019-06-18 | End: 2019-06-18 | Stop reason: HOSPADM

## 2019-06-18 RX ORDER — EPHEDRINE SULFATE 50 MG/ML
INJECTION, SOLUTION INTRAVENOUS
Status: DISCONTINUED | OUTPATIENT
Start: 2019-06-18 | End: 2019-06-18

## 2019-06-18 RX ORDER — GLYCOPYRROLATE 0.2 MG/ML
INJECTION INTRAMUSCULAR; INTRAVENOUS
Status: DISCONTINUED | OUTPATIENT
Start: 2019-06-18 | End: 2019-06-18

## 2019-06-18 RX ORDER — OXYCODONE AND ACETAMINOPHEN 5; 325 MG/1; MG/1
1 TABLET ORAL EVERY 4 HOURS PRN
Status: DISCONTINUED | OUTPATIENT
Start: 2019-06-18 | End: 2019-06-18 | Stop reason: HOSPADM

## 2019-06-18 RX ORDER — VANCOMYCIN HYDROCHLORIDE 1 G/20ML
INJECTION, POWDER, LYOPHILIZED, FOR SOLUTION INTRAVENOUS
Status: DISCONTINUED | OUTPATIENT
Start: 2019-06-18 | End: 2019-06-18 | Stop reason: HOSPADM

## 2019-06-18 RX ORDER — ONDANSETRON 2 MG/ML
4 INJECTION INTRAMUSCULAR; INTRAVENOUS DAILY PRN
Status: DISCONTINUED | OUTPATIENT
Start: 2019-06-18 | End: 2019-06-18 | Stop reason: HOSPADM

## 2019-06-18 RX ADMIN — DEXAMETHASONE SODIUM PHOSPHATE 4 MG: 4 INJECTION, SOLUTION INTRAMUSCULAR; INTRAVENOUS at 07:06

## 2019-06-18 RX ADMIN — SODIUM CHLORIDE, SODIUM GLUCONATE, SODIUM ACETATE, POTASSIUM CHLORIDE, MAGNESIUM CHLORIDE, SODIUM PHOSPHATE, DIBASIC, AND POTASSIUM PHOSPHATE: .53; .5; .37; .037; .03; .012; .00082 INJECTION, SOLUTION INTRAVENOUS at 08:06

## 2019-06-18 RX ADMIN — EPHEDRINE SULFATE 5 MG: 50 INJECTION, SOLUTION INTRAMUSCULAR; INTRAVENOUS; SUBCUTANEOUS at 08:06

## 2019-06-18 RX ADMIN — FENTANYL CITRATE 25 MCG: 50 INJECTION, SOLUTION INTRAMUSCULAR; INTRAVENOUS at 09:06

## 2019-06-18 RX ADMIN — OXYCODONE HYDROCHLORIDE AND ACETAMINOPHEN 1 TABLET: 5; 325 TABLET ORAL at 11:06

## 2019-06-18 RX ADMIN — PROPOFOL 40 MG: 10 INJECTION, EMULSION INTRAVENOUS at 08:06

## 2019-06-18 RX ADMIN — SODIUM CHLORIDE: 0.9 INJECTION, SOLUTION INTRAVENOUS at 06:06

## 2019-06-18 RX ADMIN — SUCCINYLCHOLINE CHLORIDE 60 MG: 20 INJECTION, SOLUTION INTRAMUSCULAR; INTRAVENOUS at 07:06

## 2019-06-18 RX ADMIN — PROPOFOL 40 MG: 10 INJECTION, EMULSION INTRAVENOUS at 10:06

## 2019-06-18 RX ADMIN — LIDOCAINE HYDROCHLORIDE 0.1 MG: 10 INJECTION, SOLUTION EPIDURAL; INFILTRATION; INTRACAUDAL; PERINEURAL at 06:06

## 2019-06-18 RX ADMIN — FENTANYL CITRATE 25 MCG: 50 INJECTION, SOLUTION INTRAMUSCULAR; INTRAVENOUS at 10:06

## 2019-06-18 RX ADMIN — SODIUM CHLORIDE: 0.9 INJECTION, SOLUTION INTRAVENOUS at 07:06

## 2019-06-18 RX ADMIN — PROPOFOL 40 MG: 10 INJECTION, EMULSION INTRAVENOUS at 07:06

## 2019-06-18 RX ADMIN — ROCURONIUM BROMIDE 10 MG: 10 INJECTION, SOLUTION INTRAVENOUS at 08:06

## 2019-06-18 RX ADMIN — FENTANYL CITRATE 100 MCG: 50 INJECTION, SOLUTION INTRAMUSCULAR; INTRAVENOUS at 07:06

## 2019-06-18 RX ADMIN — CEFAZOLIN 2 G: 330 INJECTION, POWDER, FOR SOLUTION INTRAMUSCULAR; INTRAVENOUS at 07:06

## 2019-06-18 RX ADMIN — PROPOFOL 40 MG: 10 INJECTION, EMULSION INTRAVENOUS at 09:06

## 2019-06-18 RX ADMIN — LIDOCAINE HYDROCHLORIDE 50 MG: 20 INJECTION, SOLUTION INTRAVENOUS at 07:06

## 2019-06-18 RX ADMIN — KETOROLAC TROMETHAMINE 30 MG: 30 INJECTION, SOLUTION INTRAMUSCULAR; INTRAVENOUS at 09:06

## 2019-06-18 RX ADMIN — FENTANYL CITRATE 50 MCG: 50 INJECTION, SOLUTION INTRAMUSCULAR; INTRAVENOUS at 08:06

## 2019-06-18 RX ADMIN — ROCURONIUM BROMIDE 25 MG: 10 INJECTION, SOLUTION INTRAVENOUS at 07:06

## 2019-06-18 RX ADMIN — PROPOFOL 150 MG: 10 INJECTION, EMULSION INTRAVENOUS at 07:06

## 2019-06-18 RX ADMIN — GLYCOPYRROLATE 0.6 MG: 0.2 INJECTION, SOLUTION INTRAMUSCULAR; INTRAVENOUS at 10:06

## 2019-06-18 RX ADMIN — NEOSTIGMINE METHYLSULFATE 5 MG: 1 INJECTION INTRAVENOUS at 10:06

## 2019-06-18 RX ADMIN — ROCURONIUM BROMIDE 5 MG: 10 INJECTION, SOLUTION INTRAVENOUS at 07:06

## 2019-06-18 RX ADMIN — ACETAMINOPHEN 1000 MG: 10 INJECTION, SOLUTION INTRAVENOUS at 07:06

## 2019-06-18 RX ADMIN — ONDANSETRON 4 MG: 2 INJECTION INTRAMUSCULAR; INTRAVENOUS at 10:06

## 2019-06-18 RX ADMIN — PROPOFOL 50 MG: 10 INJECTION, EMULSION INTRAVENOUS at 07:06

## 2019-06-18 RX ADMIN — SUCCINYLCHOLINE CHLORIDE 140 MG: 20 INJECTION, SOLUTION INTRAMUSCULAR; INTRAVENOUS at 07:06

## 2019-06-18 NOTE — DISCHARGE SUMMARY
[facility]  Neurosurgery  Discharge Summary      Patient Name: Karen Armstrong  MRN: [id number]  Admission Date: 6/18/2019  Hospital Length of Stay: 1 days  Discharge Date and Time:  06/18/2019 3:56 PM  Attending Physician: Ary Patel MD  Discharging Provider: Corky Jackson MD  Primary Care Physician: Primary Doctor No    HPI: Ms. Karen armstrong is a 69 yo female with a PMH of arthritis, hyperlipidemia, and hypertension with longstanding low back pain and leg pain.  Her back pain is equal to her leg pain.  Her leg pain goes down the posterior aspect of her legs bilaterally.  Many years ago she had an intrathecal catheter and pain pump placed for this chronic pain.  She states that her pain had been well controlled with the pain pump for many years.  She has undergone 2 revisions of the pump.  This 2nd revision was in July of 2016.  During this revision, the Medtronic intrathecal pump was replaced with a Flowonix pump.  The patient states that since the new pump was placed she has never had as good pain relief and that the pump never worked right.  In October of 2018 she had withdrawal symptoms and after with the withdrawal symptoms resolved she noticed that she no longer had any pain relief at all.  Her pain pump has not currently been working. Further she had recent conversation with her primary providers about increasing the quantity of medication with alteration of dosing, meaning she will now be able to tolerate a larger pump size for implantation.     Procedure(s) (LRB):  Insertion, Intrathecal Pump (N/A)     Indwelling Lines/Drains at time of discharge:  Lines/Drains/Airways          None          Hospital Course (synopsis of major diagnoses, care, treatment, and services provided during the course of the hospital stay): To OR today for intrathecal dilaudid pump removal and replacement.     Consults: none    Significant Diagnostic Studies: Labs:   BMP:   Recent Labs   Lab  06/18/19  0600         K 3.8      CO2 28   BUN 12   CREATININE 0.8   CALCIUM 9.7   , CMP   Recent Labs   Lab 06/18/19  0600      K 3.8      CO2 28      BUN 12   CREATININE 0.8   CALCIUM 9.7   PROT 7.0   ALBUMIN 3.3*   BILITOT 0.7   ALKPHOS 109   AST 35   ALT 32   ANIONGAP 9   ESTGFRAFRICA >60.0   EGFRNONAA >60.0    and CBC   Recent Labs   Lab 06/18/19  0600   WBC 7.61   HGB 13.7   HCT 42.4          Pending Diagnostic Studies:     Procedure Component Value Units Date/Time    HIV RNA, quantitative, PCR [[id number]] Collected:  06/18/19 1054    Order Status:  Sent Lab Status:  In process Updated:  06/18/19 1133    Specimen:  Blood     Hepatitis C RNA, quantitative, PCR [[id number]] Collected:  06/18/19 1054    Order Status:  Sent Lab Status:  In process Updated:  06/18/19 1133    Specimen:  Blood           Final Active Diagnoses:    Diagnosis Date Noted POA    PRINCIPAL PROBLEM:  Chronic pain syndrome [G89.4] 06/03/2019 Yes      Problems Resolved During this Admission:       Discharged Condition: good    Follow Up:  Wound care instructions were given  Aspirin is to be held post op  She will follow up in clinic in 2 weeks to neurosurgery clinic  Pain pump was filled with saline for later intended start date  Discharged on oral pain medicine and antibiotics (keflex) for 5days for surgical prophylaxis.     Patient Instructions:      Diet Adult Regular     Notify your health care provider if you experience any of the following:  increased confusion or weakness     Notify your health care provider if you experience any of the following:  persistent dizziness, light-headedness, or visual disturbances     Notify your health care provider if you experience any of the following:  worsening rash     Notify your health care provider if you experience any of the following:  severe persistent headache     Notify your health care provider if you experience any of the following:  difficulty  breathing or increased cough     Notify your health care provider if you experience any of the following:  redness, tenderness, or signs of infection (pain, swelling, redness, odor or green/yellow discharge around incision site)     Notify your health care provider if you experience any of the following:  severe uncontrolled pain     Notify your health care provider if you experience any of the following:  persistent nausea and vomiting or diarrhea     Notify your health care provider if you experience any of the following:  temperature >100.4     Remove dressing in 48 hours     Activity as tolerated     Medications:  Reconciled Home Medications:      Medication List      START taking these medications    cephALEXin 500 MG capsule  Commonly known as:  KEFLEX  Take 1 capsule (500 mg total) by mouth every 12 (twelve) hours. for 5 days        CHANGE how you take these medications    * oxyCODONE-acetaminophen  mg per tablet  Commonly known as:  PERCOCET  Take 1 tablet by mouth 4 (four) times daily as needed.  What changed:  Another medication with the same name was added. Make sure you understand how and when to take each.     * oxyCODONE-acetaminophen 5-325 mg per tablet  Commonly known as:  PERCOCET  Take 1 tablet by mouth every 4 (four) hours as needed (Pain 7-10/10).  What changed:  You were already taking a medication with the same name, and this prescription was added. Make sure you understand how and when to take each.         * This list has 2 medication(s) that are the same as other medications prescribed for you. Read the directions carefully, and ask your doctor or other care provider to review them with you.            CONTINUE taking these medications    albuterol 0.63 mg/3 mL Nebu  Commonly known as:  ACCUNEB  Take 0.63 mg by nebulization every 6 (six) hours as needed. Rescue     atorvastatin 20 MG tablet  Commonly known as:  LIPITOR  Take 20 mg by mouth once daily.     azelastine 137 mcg (0.1 %)  nasal spray  Commonly known as:  ASTELIN  1 spray by Nasal route once daily.     cyanocobalamin 100 MCG tablet  Commonly known as:  VITAMIN B-12  Take 100 mcg by mouth once daily.     * fluticasone propionate 110 mcg/actuation inhaler  Commonly known as:  FLOVENT HFA  Inhale 1 puff into the lungs 2 (two) times daily. Controller     * fluticasone propionate 50 mcg/actuation nasal spray  Commonly known as:  FLONASE  2 sprays every evening.     furosemide 20 MG tablet  Commonly known as:  LASIX  Take 20 mg by mouth once daily.     levothyroxine 100 MCG tablet  Commonly known as:  SYNTHROID  Take 100 mcg by mouth once daily.     lisinopril 10 MG tablet  Take 20 mg by mouth once daily.     LUTEIN ORAL  Take by mouth.     metoprolol succinate 50 MG 24 hr tablet  Commonly known as:  TOPROL-XL  Take 50 mg by mouth once daily.     montelukast 10 mg tablet  Commonly known as:  SINGULAIR  Take 10 mg by mouth once daily.     MYRBETRIQ 50 mg Tb24  Generic drug:  mirabegron  TAKE 1 TABLET BY MOUTH SWALLOWING THE WHOLE TABLET WITH WATER ONCE DAILY     OMEGA-3 2100 ORAL  Take by mouth once daily.     pantoprazole 40 MG tablet  Commonly known as:  PROTONIX  Take 40 mg by mouth once daily.         * This list has 2 medication(s) that are the same as other medications prescribed for you. Read the directions carefully, and ask your doctor or other care provider to review them with you.            STOP taking these medications    aspirin 81 MG EC tablet  Commonly known as:  ECOTRIN            Time spent on the discharge of patient: 30 minutes    Corky Jackson MD  Neurosurgery  [facility]

## 2019-06-18 NOTE — PLAN OF CARE
Pt resting comfortably.    Call light in reach.    No questions or concerns at this time.  Belongings sent home

## 2019-06-18 NOTE — ANESTHESIA POSTPROCEDURE EVALUATION
Anesthesia Post Evaluation    Patient: Karen Mccabe    Procedure(s) Performed: Procedure(s) (LRB):  Insertion, Intrathecal Pump (N/A)    Final Anesthesia Type: general  Patient location during evaluation: PACU  Patient participation: Yes- Able to Participate  Level of consciousness: awake and alert and oriented  Post-procedure vital signs: reviewed and stable  Pain management: adequate  Airway patency: patent  PONV status at discharge: No PONV  Anesthetic complications: no      Cardiovascular status: hemodynamically stable  Respiratory status: unassisted  Hydration status: euvolemic  Follow-up not needed.          Vitals Value Taken Time   /60 6/18/2019  1:02 PM   Temp 37.1 °C (98.8 °F) 6/18/2019  1:00 PM   Pulse 74 6/18/2019  1:13 PM   Resp 18 6/18/2019  1:00 PM   SpO2 97 % 6/18/2019  1:13 PM   Vitals shown include unvalidated device data.      Event Time     Out of Recovery 12:33:02          Pain/Adrien Score: Pain Rating Prior to Med Admin: 3 (6/18/2019 12:00 PM)  Pain Rating Post Med Admin: 3 (6/18/2019 12:00 PM)  Adrien Score: 10 (6/18/2019 12:00 PM)

## 2019-06-18 NOTE — INTERVAL H&P NOTE
The patient has been examined and the H&P has been reviewed:    I concur with the findings and no changes have occurred since H&P was written.  To OR today for intrathecal pump replacement/insertion  Patient consented and marked  Further orders to follow procedure    Anesthesia/Surgery risks, benefits and alternative options discussed and understood by patient/family.          Active Hospital Problems    Diagnosis  POA    Chronic pain syndrome [G89.4]  Yes      Resolved Hospital Problems   No resolved problems to display.

## 2019-06-18 NOTE — DISCHARGE INSTRUCTIONS
--Patient stable for discharge to Home    --Please take prescriptions as detailed in medication list    --All questions/concerns addressed and answered    --Please followup with neurosurgery clinic in 2 weeks for wound check; to be arranged by Neurosurgery Clinic     --Please call immediately for any new onset nausea/vomiting/fever/chills, wound breakdown, numbness/tingling/weakness    Wound Care Instructions:  -If you have any dressings at discharge, please remove 48 hours after the surgery.  -If you have steri strips, do not remove, as they will fall off.  -If you have staples, do not removed, as they will be removed at clinic follow up.  -You may shower daily but do not soak or submerge wound in water. Pat incision dry, do not rub.  -Keep all wounds clean, dry, and open to air.  -Do not apply creams or ointments to the wound.  -No driving while on narcotic pain medications  -If you were given a brace for spine surgery, please remove to shower, may remove while in bed, no driving, and must be worn for 6 weeks when out of bed.  -Call Neurosurgery if the wound opens, drains, or becomes red.

## 2019-06-18 NOTE — TRANSFER OF CARE
"Anesthesia Transfer of Care Note    Patient: Karen Mccabe    Procedure(s) Performed: Procedure(s) (LRB):  Insertion, Intrathecal Pump (N/A)    Patient location: PACU    Anesthesia Type: general    Transport from OR: Transported from OR on 6-10 L/min O2 by face mask with adequate spontaneous ventilation    Post pain: pain needs to be addressed    Post assessment: no apparent anesthetic complications and tolerated procedure well    Post vital signs: stable    Level of consciousness: awake    Complications: none    Transfer of care protocol was followed      Last vitals:   Visit Vitals  /61 (BP Location: Left arm, Patient Position: Lying)   Pulse 84   Temp 36 °C (96.8 °F) (Temporal)   Resp 17   Ht 5' 2" (1.575 m)   Wt 108.4 kg (239 lb)   SpO2 96%   Breastfeeding? No   BMI 43.71 kg/m²     "

## 2019-06-18 NOTE — BRIEF OP NOTE
[facility]  Brief Operative Note  Neurosurgery    SUMMARY     Surgery Date: 6/18/2019     Surgeon(s) and Role:     * Ary Patel MD - Primary     * Corky Jackson MD - Resident - Assisting        Pre-op Diagnosis:  Chronic pain syndrome [G89.4]  Post-laminectomy syndrome [M96.1]    Post-op Diagnosis: Post-Op Diagnosis Codes:     * Chronic pain syndrome [G89.4]     * Post-laminectomy syndrome [M96.1]     Procedure Performed: Removal of intrathecal pump; Insertion of intrathecal pump.     Procedure(s) (LRB):  Insertion, Intrathecal Pump (N/A)    Technical Procedures Used: Left lateral and posterior incisions, removal of prior pain pump and catheter, Lumbar puncture and fluoroscopic guided placement of intrathecal catheter to T9-10 level, tunneling of catheter and pump insertion to lateral pocket, internal closure.     Description of the findings of the procedure: See full op report    Estimated Blood Loss: 50cc         Specimens:   Specimen (12h ago, onward)    None

## 2019-06-19 ENCOUNTER — TELEPHONE (OUTPATIENT)
Dept: NEUROSURGERY | Facility: CLINIC | Age: 69
End: 2019-06-19

## 2019-06-19 VITALS
HEART RATE: 72 BPM | WEIGHT: 239 LBS | RESPIRATION RATE: 18 BRPM | SYSTOLIC BLOOD PRESSURE: 129 MMHG | TEMPERATURE: 99 F | OXYGEN SATURATION: 95 % | BODY MASS INDEX: 43.98 KG/M2 | DIASTOLIC BLOOD PRESSURE: 60 MMHG | HEIGHT: 62 IN

## 2019-06-19 NOTE — TELEPHONE ENCOUNTER
----- Message from Elsy Washington MA sent at 6/18/2019  4:51 PM CDT -----      ----- Message -----  From: Corky Jackson MD  Sent: 6/18/2019   4:01 PM  To: Amanda Mcallister Staff    Patient discharged from the hospital today after a same day surgery for dilaudid pain pump exchange of catheter and pump.  She will need follow up in 2 weeks for wound check.

## 2019-06-19 NOTE — OP NOTE
DATE OF PROCEDURE:  06/18/2019     PREOPERATIVE DIAGNOSIS:  Chronic pain syndrome, lumbar post-laminectomy syndrome, and intrathecal opioid pump malfunction.     POSTOPERATIVE DIAGNOSIS: Chronic pain syndrome, lumbar post-laminectomy syndrome, and intrathecal opioid pump malfunction.     OPERATIVE PROCEDURE:  Replacement intrathecal catheter as well as replacement of intrathecal opioid pump placement with programming.     SURGEON:  Ary Patel M.D.     ASSISTANT:  Corky Jackson M.D. (RES)     ANESTHESIA:  General.     ESTIMATED BLOOD LOSS:  10 mL     INDICATION FOR PROCEDURE:    is a 68-year-old female with   A longstanding history of low back pain and leg pain.  She had a lumbar fusion   as well as multiple epidural steroid injections which did not help her pain.    Ultimately, she had an intrathecal catheter as well as intrathecal opioid pump   placed for pain relief.  The patient did well for years.  In July of 2016, her Medtronic   pump was replaced with a Flowonix pump.  The patient states that since this   time she has never gotten great pain relief.  Furthermore in October of 2018   she began noticing withdrawal symptoms.  After the withdrawal symptoms past   she noticed that she got no relief from the pain pump any more.  It was thought   that there was a catheter failure.  Therefore, the decision was made to bring   her to the Operating Room for intrathecal catheter replacement and pump   replacement with the Medtronic pump.     OPERATIVE NOTE:  After obtaining informed consent, the patient was brought   into the Operating Room.  She was intubated and anesthetized by Anesthesia.    Preoperative antibiotics were given.  The patient was placed in the lateral   decubitus position with the right side down with her hips over the break in bed.    The bed was flexed, exposing the back and flank on the left side.  The back,   flank and abdomen were prepped and draped in the standard sterile fashion.     Skin incision was made first in the back using a #15 blade.  This was carried   down to the level of the lumbodorsal fascia using Bovie electrocautery.  The   patient's previous intrathecal catheter as well as anchoring Booty was identified   and freed from surrounding scar tissue.  The catheter was cut, a mosquito was   placed on the distal end of the catheter.  We observed for any spinal fluid from   the intrathecal portion of the catheter.  There was none.  Therefore the decision   was made to remove the intrathecal catheter and replaced it with a new one. The catheter would not come easily therefore the decision was made to leave the old catheter in the intrathecal space. The lumbodorsal fascia over where the catheter   was removed was closed with a 0 Vicryl in a pursestring fashion.  A new spinal   needle was introduced at the L2-L3 level and using anatomic landmarks as well   as fluoroscopic guidance was introduced into the intrathecal space.  Spinal fluid   was obtained.  We then passed the catheter up to approximately the T9 level.    The spinal needle and stylet were removed and the anchoring device was put into position and anchored down using 4-0 Nurolon.  We then turned our attention to  the abdomen.  Again, skin incision was made using the patient's previous incision   using a #15 blade.  A transverse incision was made in the left upper quadrant.  We dissected down to the pump pocket using Bovie electrocautery.  The pump was identified and elevated out of the pocket.  It was disconnected from the distal   portion of the intrathecal catheter.  The old pump was passed off the field as   Specimen.  We attempted to remove the remainder of the old catheter.  However,   the catheter would not come easily so we removed as much as we safely could.    The new catheter was then tunneled from the back to the abdomen.  The catheter   was cut to length, attached to the connection system and then to the pump.     The pump pocket was extended slightly caudally using Bovie electrocautery.    A new 40 mL pump was prepared on the back table.  It was loaded with preservative-free normal saline.  The new pump was placed back in the   pocket and anchored down using 2-0 silk sutures.  Both wounds were copiously  irrigated.  Vancomycin powder was placed in the wounds and the wounds were   closed in layers.  Sterile dressings were put in place.  The pump was programmed   to minimal rate. The final new catheter length was 104.1 cm.  The patient was   extubated by Anesthesia and brought to the Recovery Room in stable condition.    All counts were correct at the end of the case.

## 2019-06-20 ENCOUNTER — TELEPHONE (OUTPATIENT)
Dept: NEUROSURGERY | Facility: CLINIC | Age: 69
End: 2019-06-20

## 2019-06-20 NOTE — TELEPHONE ENCOUNTER
----- Message from Karly Londono sent at 6/20/2019  1:18 PM CDT -----  Contact: Tiffanie (sister) @ [phone]  Pt had a new pain pump put in on 6-18-19.  The medication in her old pain pump was not transferred to the new pump.  The new one was filled with saline.  Pt is in severe pain.  Would like to get the pump filled asap.  They found a Dr at [facility], Pain Management but they want to know what pain pump was put in and the name of the medication that was used in the pain pump.  Pls fax to [phone].

## 2019-06-21 LAB
HCV RNA SERPL NAA+PROBE-LOG IU: <1.08 LOG (10) IU/ML
HCV RNA SERPL QL NAA+PROBE: NOT DETECTED IU/ML
HCV RNA SPEC NAA+PROBE-ACNC: <12 IU/ML
HIV UQ DATE RECEIVED: NORMAL
HIV UQ DATE REPORTED: NORMAL
HIV1 RNA # SERPL NAA+PROBE: <40 COPIES/ML
HIV1 RNA SERPL NAA+PROBE-LOG#: <1.6 LOG (10) COPIES/ML
HIV1 RNA SERPL QL NAA+PROBE: NOT DETECTED

## 2019-07-26 ENCOUNTER — OFFICE VISIT (OUTPATIENT)
Dept: NEUROSURGERY | Facility: CLINIC | Age: 69
End: 2019-07-26
Payer: MEDICARE

## 2019-07-26 ENCOUNTER — TELEPHONE (OUTPATIENT)
Dept: NEUROSURGERY | Facility: CLINIC | Age: 69
End: 2019-07-26

## 2019-07-26 VITALS
DIASTOLIC BLOOD PRESSURE: 57 MMHG | HEART RATE: 75 BPM | SYSTOLIC BLOOD PRESSURE: 114 MMHG | BODY MASS INDEX: 42.34 KG/M2 | WEIGHT: 231.5 LBS

## 2019-07-26 DIAGNOSIS — G89.4 CHRONIC PAIN SYNDROME: Primary | ICD-10-CM

## 2019-07-26 DIAGNOSIS — M96.1 LUMBAR POST-LAMINECTOMY SYNDROME: ICD-10-CM

## 2019-07-26 PROCEDURE — 99024 PR POST-OP FOLLOW-UP VISIT: ICD-10-PCS | Mod: POP,,, | Performed by: NEUROLOGICAL SURGERY

## 2019-07-26 PROCEDURE — 99024 POSTOP FOLLOW-UP VISIT: CPT | Mod: POP,,, | Performed by: NEUROLOGICAL SURGERY

## 2019-07-26 PROCEDURE — 99213 OFFICE O/P EST LOW 20 MIN: CPT | Mod: PBBFAC | Performed by: NEUROLOGICAL SURGERY

## 2019-07-26 PROCEDURE — 99999 PR PBB SHADOW E&M-EST. PATIENT-LVL III: ICD-10-PCS | Mod: PBBFAC,,, | Performed by: NEUROLOGICAL SURGERY

## 2019-07-26 PROCEDURE — 99999 PR PBB SHADOW E&M-EST. PATIENT-LVL III: CPT | Mod: PBBFAC,,, | Performed by: NEUROLOGICAL SURGERY

## 2019-07-26 RX ORDER — MIRTAZAPINE 15 MG/1
TABLET, FILM COATED ORAL
COMMUNITY

## 2019-07-26 RX ORDER — RIZATRIPTAN BENZOATE 10 MG/1
TABLET, ORALLY DISINTEGRATING ORAL
COMMUNITY
End: 2019-10-14

## 2019-07-26 RX ORDER — PANTOPRAZOLE SODIUM 40 MG/1
TABLET, DELAYED RELEASE ORAL
COMMUNITY
End: 2019-10-14

## 2019-07-26 RX ORDER — AZELASTINE 1 MG/ML
SPRAY, METERED NASAL
COMMUNITY

## 2019-07-26 RX ORDER — LEVOTHYROXINE SODIUM 100 UG/1
TABLET ORAL
COMMUNITY
End: 2019-10-14

## 2019-07-26 RX ORDER — FLUTICASONE PROPIONATE 50 MCG
SPRAY, SUSPENSION (ML) NASAL
COMMUNITY

## 2019-07-26 RX ORDER — OXYBUTYNIN CHLORIDE 10 MG/1
TABLET, EXTENDED RELEASE ORAL
COMMUNITY

## 2019-07-26 RX ORDER — LISINOPRIL 10 MG/1
TABLET ORAL
COMMUNITY
End: 2019-10-14

## 2019-07-26 RX ORDER — ALBUTEROL SULFATE 90 UG/1
AEROSOL, METERED RESPIRATORY (INHALATION)
COMMUNITY
End: 2019-10-14

## 2019-07-26 RX ORDER — PHENAZOPYRIDINE HYDROCHLORIDE 200 MG/1
TABLET, FILM COATED ORAL
COMMUNITY
End: 2019-10-14

## 2019-07-26 RX ORDER — KETOCONAZOLE 20 MG/G
CREAM TOPICAL
COMMUNITY
End: 2019-10-14

## 2019-07-26 RX ORDER — MONTELUKAST SODIUM 10 MG/1
TABLET ORAL
COMMUNITY
End: 2019-10-14

## 2019-07-26 RX ORDER — OXYCODONE HYDROCHLORIDE 15 MG/1
TABLET ORAL
COMMUNITY
End: 2019-10-14

## 2019-07-26 RX ORDER — FLUOCINONIDE 0.5 MG/G
1 CREAM TOPICAL 2 TIMES DAILY
Refills: 2 | COMMUNITY
Start: 2019-07-06 | End: 2019-10-14

## 2019-07-26 RX ORDER — ATORVASTATIN CALCIUM 20 MG/1
TABLET, FILM COATED ORAL
COMMUNITY
End: 2019-10-14

## 2019-07-26 RX ORDER — METHYLPREDNISOLONE 4 MG/1
TABLET ORAL
Refills: 0 | COMMUNITY
Start: 2019-06-21

## 2019-07-26 RX ORDER — METOPROLOL SUCCINATE 50 MG/1
TABLET, EXTENDED RELEASE ORAL
COMMUNITY
End: 2019-10-14

## 2019-07-26 NOTE — PROGRESS NOTES
"Established Pateint    SUBJECTIVE:     History of Present Illness:  Ms. Mccabe is a 68-year-old female who is seeing me today in follow-up.  She was taken to the operating room on June 18, 2019 for replacement of an intrathecal catheter as well as opioid pump.  Preoperatively, she complained of a longstanding history of low back pain and leg pain.  She had a lumbar fusion as well as multiple epidural steroid injections which did not help her pain.  Ultimately, she had an intrathecal catheter as well as intrathecal opioid pump placed for pain relief.  She did well for years.  In July 2016, her Medtronic pump was replaced with a Flowonix pump.  The patient stated that since that time she had never gotten great pain relief.  Furthermore, in October 2018 she began noticing withdrawal symptoms.  After withdrawal symptoms passed, she noticed that she was no longer getting relief from the pain pump.  It was thought that there is a catheter failure.  Therefore, the decision was made to take the patient to the operating room for intrathecal catheter as well as opioid pump replacement with a Medtronic pump.  She is here today to see me in follow-up.  She reports no problems or complications postoperatively with the pump.  The pump repositioning seems to be much better for the patient.  She is seeing a pain management physician in [place] who has her on a very low dose of intrathecal Dilaudid.  The patient reports that this is not significantly relieving her pain.  She wishes to be referred to a local pain management physician for pump management.  She also reports a history of a left foot drop which has been present for 6 months and possible increasing right foot weakness.  She wishes to further investigate the reason for this.    Review of patient's allergies indicates:   Allergen Reactions    Morphine Anaphylaxis    Versed [midazolam] Hallucinations     "went crazy"    Codeine Hives and Rash       Current Outpatient " Medications   Medication Sig Dispense Refill    albuterol (VENTOLIN HFA) 90 mcg/actuation inhaler Ventolin HFA 90 mcg/actuation aerosol inhaler      atorvastatin (LIPITOR) 20 MG tablet atorvastatin 20 mg tablet      azelastine (ASTELIN) 137 mcg (0.1 %) nasal spray azelastine 137 mcg (0.1 %) nasal spray aerosol      cyanocobalamin (VITAMIN B-12) 100 MCG tablet Take 100 mcg by mouth once daily.      fluocinonide 0.05% (LIDEX) 0.05 % cream 1 application 2 (two) times daily. Apply to affected area  2    fluticasone propionate (FLONASE) 50 mcg/actuation nasal spray fluticasone propionate 50 mcg/actuation nasal spray,suspension      furosemide (LASIX) 20 MG tablet Take 20 mg by mouth once daily.       ketoconazole (NIZORAL) 2 % cream ketoconazole 2 % topical cream      levothyroxine (SYNTHROID) 100 MCG tablet levothyroxine 100 mcg tablet      lisinopril 10 MG tablet lisinopril 10 mg tablet      LUTEIN ORAL Take by mouth.      methylPREDNISolone (MEDROL DOSEPACK) 4 mg tablet TAKE 6 TABLETS ON DAY 1 AS DIRECTED ON PACKAGE AND DECREASE BY 1 TAB EACH DAY FOR A TOTAL OF 6 DAYS  0    metoprolol succinate (TOPROL-XL) 50 MG 24 hr tablet metoprolol succinate ER 50 mg tablet,extended release 24 hr      metroNIDAZOLE (NORITATE) 1 % cream metronidazole 1 % topical gel      mirabegron (MYRBETRIQ) 50 mg Tb24 Myrbetriq 50 mg tablet,extended release      mirtazapine (REMERON) 15 MG tablet mirtazapine 15 mg tablet      montelukast (SINGULAIR) 10 mg tablet montelukast 10 mg tablet      omega-3/dha/epa/dpa/fish oil (OMEGA-3 2100 ORAL) Take by mouth once daily.      oxyCODONE-acetaminophen (PERCOCET)  mg per tablet Take 1 tablet by mouth 4 (four) times daily as needed.  0    pantoprazole (PROTONIX) 40 MG tablet pantoprazole 40 mg tablet,delayed release      phenazopyridine (PYRIDIUM) 200 MG tablet phenazopyridine 200 mg tablet      rizatriptan (MAXALT-MLT) 10 MG disintegrating tablet rizatriptan 10 mg  disintegrating tablet      oxybutynin (DITROPAN-XL) 10 MG 24 hr tablet oxybutynin chloride ER 10 mg tablet,extended release 24 hr      oxyCODONE (ROXICODONE) 15 MG Tab oxycodone 15 mg tablet       No current facility-administered medications for this visit.        Past Medical History:   Diagnosis Date    Arthritis     Asthma     Encounter for blood transfusion     Hyperlipidemia     Hypertension     Thyroid disease      Past Surgical History:   Procedure Laterality Date    CHOLECYSTECTOMY      GASTRIC RESTRICTION SURGERY      HYSTERECTOMY      Insertion, Intrathecal Pump N/A 6/18/2019    Performed by Ary Patel MD at [facility] OR [address] [place] [address]    KNEE SURGERY Right     neck fusion       SPINE SURGERY      TONSILLECTOMY       Family History     None        Social History     Socioeconomic History    Marital status:      Spouse name: Not on file    Number of children: Not on file    Years of education: Not on file    Highest education level: Not on file   Occupational History    Not on file   Social Needs    Financial resource strain: Not on file    Food insecurity:     Worry: Not on file     Inability: Not on file    Transportation needs:     Medical: Not on file     Non-medical: Not on file   Tobacco Use    Smoking status: Never Smoker    Smokeless tobacco: Never Used   Substance and Sexual Activity    Alcohol use: Never     Frequency: Never    Drug use: Never    Sexual activity: Not Currently   Lifestyle    Physical activity:     Days per week: Not on file     Minutes per session: Not on file    Stress: Not on file   Relationships    Social connections:     Talks on phone: Not on file     Gets together: Not on file     Attends Orthodoxy service: Not on file     Active member of club or organization: Not on file     Attends meetings of clubs or organizations: Not on file     Relationship status: Not on file   Other Topics Concern    Not on file   Social History Narrative    Not on  file       Review of Systems:  Review of Systems    OBJECTIVE:     Vital Signs  Pulse: 75  BP: (!) 114/57  Pain Score:   8  Weight: 105 kg (231 lb 8 oz)  Body mass index is 42.34 kg/m².    Physical Exam:  Physical Exam:  Vitals reviewed.    Constitutional: She appears well-developed and well-nourished. No distress.     Eyes: Pupils are equal, round, and reactive to light. Conjunctivae and EOM are normal.     Cardiovascular: Normal rate, regular rhythm, normal pulses and no edema.     Abdominal: Soft. Bowel sounds are normal.     Skin: Skin displays no rash on trunk and no rash on extremities. Skin displays no lesions on trunk and no lesions on extremities.     Psych/Behavior: She is alert. She is oriented to person, place, and time. She has a normal mood and affect.     Musculoskeletal: Gait is normal.        Neck: Range of motion is full. There is no tenderness. Muscle strength is 5/5. Tone is normal.        Back: Range of motion is full. There is no tenderness. Muscle strength is 5/5. Tone is normal.        Right Upper Extremities: Range of motion is full. There is no tenderness. Muscle strength is 5/5. Tone is normal.        Left Upper Extremities: Range of motion is full. There is no tenderness. Muscle strength is 5/5. Tone is normal.       Right Lower Extremities: Range of motion is full. There is no tenderness. Muscle strength is 4/5. Tone is normal.        Left Lower Extremities: Range of motion is full. There is no tenderness. Muscle strength is 4/5. Tone is normal.     Neurological:        Coordination: She has a normal Romberg Test, normal finger to nose coordination and normal tandem walking coordination.        Sensory: There is no sensory deficit in the trunk. There is no sensory deficit in the extremities.        DTRs: DTRs are DTRS NORMAL AND SYMMETRICnormal and symmetric. She displays no Babinski's sign on the right side. She displays no Babinski's sign on the left side.        Cranial nerves:  Cranial nerve(s) II, III, IV, V, VI, VII, VIII, IX, X, XI and XII are intact.     Her wounds are well-healed.  There is a small scab over the left flank incision.    Diagnostic Results:  She has no updated imaging studies available for review.    ASSESSMENT/PLAN:     Ms. Mccabe is a 68-year-old female status post intrathecal catheter as well as opioid pump replacement.  Overall, she is doing well.  However, she feels that she is not getting as good of pain relief as she would like from her current pump settings.  Her pain management physician in [place] appears to be hesitant to make too many changes to the pump settings.  She would like a 2nd opinion into be managed by someone locally.  I have given her a referral to Dr. Gary Sifuentes for management of her intrathecal pain pump.  From a lumbar fusion standpoint and the patient's complains of her possible bilateral footdrop, I have recommended that she have her family physician order an updated MRI of the lumbar spine.  Once this is done she will bring the films back to my office for further treatment recommendations.  She knows she can call with any further questions or concerns in the meantime.        Note dictated with voice recognition software, please excuse any grammatical errors.

## 2019-07-26 NOTE — TELEPHONE ENCOUNTER
----- Message from Cristofer Ash sent at 7/26/2019 11:52 AM CDT -----  Contact: Patient @ [phone]  Patient calling to get the referral and the  op report fax to: Gary Sifuentes @ [phone]

## 2019-08-22 ENCOUNTER — PATIENT MESSAGE (OUTPATIENT)
Dept: NEUROSURGERY | Facility: CLINIC | Age: 69
End: 2019-08-22

## 2019-10-01 ENCOUNTER — PATIENT MESSAGE (OUTPATIENT)
Dept: NEUROSURGERY | Facility: CLINIC | Age: 69
End: 2019-10-01

## 2019-10-14 ENCOUNTER — OFFICE VISIT (OUTPATIENT)
Dept: NEUROSURGERY | Facility: CLINIC | Age: 69
End: 2019-10-14
Payer: MEDICARE

## 2019-10-14 VITALS
BODY MASS INDEX: 43.53 KG/M2 | DIASTOLIC BLOOD PRESSURE: 56 MMHG | SYSTOLIC BLOOD PRESSURE: 116 MMHG | WEIGHT: 238 LBS | HEART RATE: 67 BPM

## 2019-10-14 DIAGNOSIS — G89.4 CHRONIC PAIN SYNDROME: ICD-10-CM

## 2019-10-14 DIAGNOSIS — M96.1 LUMBAR POST-LAMINECTOMY SYNDROME: ICD-10-CM

## 2019-10-14 DIAGNOSIS — M21.372 LEFT FOOT DROP: Primary | ICD-10-CM

## 2019-10-14 PROCEDURE — 99213 OFFICE O/P EST LOW 20 MIN: CPT | Mod: PBBFAC | Performed by: NEUROLOGICAL SURGERY

## 2019-10-14 PROCEDURE — 99999 PR PBB SHADOW E&M-EST. PATIENT-LVL III: ICD-10-PCS | Mod: PBBFAC,,, | Performed by: NEUROLOGICAL SURGERY

## 2019-10-14 PROCEDURE — 99213 OFFICE O/P EST LOW 20 MIN: CPT | Mod: S$PBB,,, | Performed by: NEUROLOGICAL SURGERY

## 2019-10-14 PROCEDURE — 99213 PR OFFICE/OUTPT VISIT, EST, LEVL III, 20-29 MIN: ICD-10-PCS | Mod: S$PBB,,, | Performed by: NEUROLOGICAL SURGERY

## 2019-10-14 PROCEDURE — 99999 PR PBB SHADOW E&M-EST. PATIENT-LVL III: CPT | Mod: PBBFAC,,, | Performed by: NEUROLOGICAL SURGERY

## 2019-10-14 RX ORDER — AMITRIPTYLINE HYDROCHLORIDE 50 MG/1
50 TABLET, FILM COATED ORAL DAILY
Refills: 5 | COMMUNITY
Start: 2019-10-05

## 2019-10-14 RX ORDER — RIZATRIPTAN BENZOATE 10 MG/1
TABLET, ORALLY DISINTEGRATING ORAL
COMMUNITY

## 2019-10-14 RX ORDER — LISINOPRIL 10 MG/1
TABLET ORAL
COMMUNITY

## 2019-10-14 RX ORDER — OXYCODONE AND ACETAMINOPHEN 10; 325 MG/1; MG/1
TABLET ORAL
COMMUNITY

## 2019-10-14 RX ORDER — METRONIDAZOLE 10 MG/G
GEL TOPICAL
COMMUNITY

## 2019-10-14 RX ORDER — SULFAMETHOXAZOLE AND TRIMETHOPRIM 800; 160 MG/1; MG/1
1 TABLET ORAL 2 TIMES DAILY
Refills: 0 | COMMUNITY
Start: 2019-08-05

## 2019-10-14 RX ORDER — PANTOPRAZOLE SODIUM 40 MG/1
TABLET, DELAYED RELEASE ORAL
COMMUNITY

## 2019-10-14 RX ORDER — CYCLOSPORINE 0.5 MG/ML
1 EMULSION OPHTHALMIC 2 TIMES DAILY
Refills: 3 | COMMUNITY
Start: 2019-09-10

## 2019-10-14 RX ORDER — LEVOTHYROXINE SODIUM 100 UG/1
TABLET ORAL
COMMUNITY

## 2019-10-14 RX ORDER — ALBUTEROL SULFATE 90 UG/1
AEROSOL, METERED RESPIRATORY (INHALATION)
COMMUNITY

## 2019-10-14 RX ORDER — FLUOCINONIDE 0.5 MG/G
CREAM TOPICAL
COMMUNITY

## 2019-10-14 RX ORDER — MIRTAZAPINE 15 MG/1
TABLET, FILM COATED ORAL
COMMUNITY

## 2019-10-14 RX ORDER — KETOCONAZOLE 20 MG/G
CREAM TOPICAL
COMMUNITY

## 2019-10-14 RX ORDER — MONTELUKAST SODIUM 10 MG/1
TABLET ORAL
COMMUNITY

## 2019-10-14 RX ORDER — ATORVASTATIN CALCIUM 20 MG/1
TABLET, FILM COATED ORAL
COMMUNITY

## 2019-10-14 RX ORDER — PHENAZOPYRIDINE HYDROCHLORIDE 200 MG/1
TABLET, FILM COATED ORAL
COMMUNITY

## 2019-10-14 RX ORDER — METOPROLOL SUCCINATE 50 MG/1
TABLET, EXTENDED RELEASE ORAL
COMMUNITY

## 2019-10-14 NOTE — PROGRESS NOTES
Established Pateint    SUBJECTIVE:     History of Present Illness:  Ms. Mccabe is a 68-year-old female who is seeing me today in follow-up.   her last neurosurgery clinic appointment was on July 26, 2019.  She was taken to the operating room on June 18, 2019 for replacement of an intrathecal catheter as well as opioid pump.  Preoperatively, she complained of a longstanding history of low back pain and leg pain.  She had a lumbar fusion as well as multiple epidural steroid injections which did not help her pain.  Ultimately, she had an intrathecal catheter as well as intrathecal opioid pump placed for pain relief.  She did well for years.  In July 2016, her Medtronic pump was replaced with a Flowonix pump.  The patient stated that since that time she had never gotten great pain relief.  Furthermore, in October 2018 she began noticing withdrawal symptoms.  After withdrawal symptoms passed, she noticed that she was no longer getting relief from the pain pump.  It was thought that there is a catheter failure.  Therefore, the decision was made to take the patient to the operating room for intrathecal catheter as well as opioid pump replacement with a Medtronic pump. At the time of her last follow-up she reported no problems or complications postoperatively with the pump.  The pump repositioning seemed to be much better for the patient.  She was seeing a pain management physician in [place] who had her on a very low dose of intrathecal Dilaudid.  The patient reports that this was not significantly relieving her pain.  Therefore, I referred her to a pain management physician in [place].  She also complained of a progressive left foot drop at the time of her last appointment.  I had sent the patient for an MRI of the lumbar spine.  She is seeing me today in follow-up.  She is now seeing Dr. Sifuentes for pain management.  He has adjusted her pump and she states that she is getting great pain relief from the intrathecal  "opioid administration.  She is happy with the results.  She still complains of left foot weakness as well as increasing right foot weakness.  She is here today to follow-up with an updated MRI of the lumbar spine.    Review of patient's allergies indicates:   Allergen Reactions    Morphine Anaphylaxis    Versed [midazolam] Hallucinations     "went crazy"    Codeine Hives and Rash       Current Outpatient Medications   Medication Sig Dispense Refill    albuterol (VENTOLIN HFA) 90 mcg/actuation inhaler Ventolin HFA 90 mcg/actuation aerosol inhaler      amitriptyline (ELAVIL) 50 MG tablet Take 50 mg by mouth once daily.  5    atorvastatin (LIPITOR) 20 MG tablet atorvastatin 20 mg tablet      azelastine (ASTELIN) 137 mcg (0.1 %) nasal spray azelastine 137 mcg (0.1 %) nasal spray aerosol      cyanocobalamin (VITAMIN B-12) 100 MCG tablet Take 100 mcg by mouth once daily.      fluocinonide 0.05% (LIDEX) 0.05 % cream fluocinonide 0.05 % topical cream      fluticasone propionate (FLONASE) 50 mcg/actuation nasal spray fluticasone propionate 50 mcg/actuation nasal spray,suspension      furosemide (LASIX) 20 MG tablet Take 20 mg by mouth once daily.       ketoconazole (NIZORAL) 2 % cream ketoconazole 2 % topical cream      levothyroxine (SYNTHROID) 100 MCG tablet levothyroxine 100 mcg tablet      lisinopril 10 MG tablet lisinopril 10 mg tablet      LUTEIN ORAL Take by mouth.      methylPREDNISolone (MEDROL DOSEPACK) 4 mg tablet TAKE 6 TABLETS ON DAY 1 AS DIRECTED ON PACKAGE AND DECREASE BY 1 TAB EACH DAY FOR A TOTAL OF 6 DAYS  0    metoprolol succinate (TOPROL-XL) 50 MG 24 hr tablet metoprolol succinate ER 50 mg tablet,extended release 24 hr      metroNIDAZOLE (NORITATE) 1 % cream metronidazole 1 % topical gel      metronidazole 1% (METROGEL) 1 % Gel metronidazole 1 % topical gel      mirabegron (MYRBETRIQ) 50 mg Tb24 Myrbetriq 50 mg tablet,extended release      mirtazapine (REMERON) 15 MG tablet " mirtazapine 15 mg tablet      mirtazapine (REMERON) 15 MG tablet mirtazapine 15 mg tablet      montelukast (SINGULAIR) 10 mg tablet montelukast 10 mg tablet      omega-3/dha/epa/dpa/fish oil (OMEGA-3 2100 ORAL) Take by mouth once daily.      oxybutynin (DITROPAN-XL) 10 MG 24 hr tablet oxybutynin chloride ER 10 mg tablet,extended release 24 hr      oxyCODONE-acetaminophen (PERCOCET)  mg per tablet oxycodone-acetaminophen 10 mg-325 mg tablet      pantoprazole (PROTONIX) 40 MG tablet pantoprazole 40 mg tablet,delayed release      phenazopyridine (PYRIDIUM) 200 MG tablet phenazopyridine 200 mg tablet      RESTASIS 0.05 % ophthalmic emulsion Place 1 drop into both eyes 2 (two) times daily.  3    rizatriptan (MAXALT-MLT) 10 MG disintegrating tablet rizatriptan 10 mg disintegrating tablet      sulfamethoxazole-trimethoprim 800-160mg (BACTRIM DS) 800-160 mg Tab Take 1 tablet by mouth 2 (two) times daily.  0     No current facility-administered medications for this visit.        Past Medical History:   Diagnosis Date    Arthritis     Asthma     Encounter for blood transfusion     Hyperlipidemia     Hypertension     Thyroid disease      Past Surgical History:   Procedure Laterality Date    CHOLECYSTECTOMY      GASTRIC RESTRICTION SURGERY      HYSTERECTOMY      INTRATHECAL PUMP IMPLANTATION N/A 6/18/2019    Procedure: Insertion, Intrathecal Pump;  Surgeon: Ary Patel MD;  Location: [facility] OR [address] [place] [address];  Service: Neurosurgery;  Laterality: N/A;  1.5HOUR/23HOUR STAY/TYPE/HOLD/REGULAR BED SLIDERS/BEANBAG/LATERAL RIGHT DOWN/C-ARM/MEDTRONICS    KNEE SURGERY Right     neck fusion       SPINE SURGERY      TONSILLECTOMY       Family History     None        Social History     Socioeconomic History    Marital status:      Spouse name: Not on file    Number of children: Not on file    Years of education: Not on file    Highest education level: Not on file   Occupational History    Not on file    Social Needs    Financial resource strain: Not on file    Food insecurity:     Worry: Not on file     Inability: Not on file    Transportation needs:     Medical: Not on file     Non-medical: Not on file   Tobacco Use    Smoking status: Never Smoker    Smokeless tobacco: Never Used   Substance and Sexual Activity    Alcohol use: Never     Frequency: Never    Drug use: Never    Sexual activity: Not Currently   Lifestyle    Physical activity:     Days per week: Not on file     Minutes per session: Not on file    Stress: Not on file   Relationships    Social connections:     Talks on phone: Not on file     Gets together: Not on file     Attends Sabianist service: Not on file     Active member of club or organization: Not on file     Attends meetings of clubs or organizations: Not on file     Relationship status: Not on file   Other Topics Concern    Not on file   Social History Narrative    Not on file       Review of Systems:  Review of Systems    OBJECTIVE:     Vital Signs  Pulse: 67  BP: (!) 116/56  Pain Score:   6  Weight: 108 kg (238 lb)  Body mass index is 43.53 kg/m².    Physical Exam:  Physical Exam:    Constitutional: She appears well-developed and well-nourished. No distress.     Eyes: Pupils are equal, round, and reactive to light. Conjunctivae and EOM are normal.     Cardiovascular: Normal rate, regular rhythm, normal pulses and no edema.     Abdominal: Soft. Bowel sounds are normal.     Skin: Skin displays no rash on trunk and no rash on extremities. Skin displays no lesions on trunk and no lesions on extremities.     Psych/Behavior: She is alert. She is oriented to person, place, and time. She has a normal mood and affect.     Musculoskeletal: Gait is normal.        Neck: Range of motion is full. There is no tenderness. Muscle strength is 5/5. Tone is normal.        Back: Range of motion is full. There is no tenderness. Muscle strength is 5/5. Tone is normal.        Right Upper Extremities:  Range of motion is full. There is no tenderness. Muscle strength is 5/5. Tone is normal.        Left Upper Extremities: Range of motion is full. There is no tenderness. Muscle strength is 5/5. Tone is normal.       Right Lower Extremities: Range of motion is full. There is no tenderness. Muscle strength is 4/5. Tone is normal.        Left Lower Extremities: Range of motion is full. There is no tenderness. Muscle strength is 4/5. Tone is normal.     Neurological:        Coordination: She has a normal Romberg Test, normal finger to nose coordination and normal tandem walking coordination.        Sensory: There is no sensory deficit in the trunk. There is no sensory deficit in the extremities.        DTRs: DTRs are DTRS NORMAL AND SYMMETRICnormal and symmetric. She displays no Babinski's sign on the right side. She displays no Babinski's sign on the left side.        Cranial nerves: Cranial nerve(s) II, III, IV, V, VI, VII, VIII, IX, X, XI and XII are intact.         Diagnostic Results:  She has a report of the MRI of the lumbar spine available for review which I personally reviewed.  This shows a posterior decompression in lumbar spine from L2-S1.  There is a posterior lateral fusion from L2-S1.  There are interbody fusions at L2-3 and L3-4.  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    ASSESSMENT/PLAN:     Ms. Mccabe is a 69-year-old female status post intrathecal opioid pump revision.  She is doing great from a postoperative standpoint in from a pump management standpoint.  She will continue to follow with Dr. Sifuentes for pump adjustments and refills.  From a footdrop standpoint, I do not see anything on the report of the MRI of the lumbar spine to account for her bilateral foot drops.  I would like to see the imaging studies.  She will send this to my office.  I will call her in follow up with this.  Since there is not anything apparent on the MRI, I believe that an EMG of the lower extremities is  warranted.  I have given her an outside referral for this.  She will also send this to my office for review was completed.  Will call the patient in follow-up and will make a further treatment plan after review the MRI images as well as the EMG.  She knows she can call with any further questions or concerns.        Note dictated with voice recognition software, please excuse any grammatical errors.

## 2020-01-03 ENCOUNTER — TELEPHONE (OUTPATIENT)
Dept: NEUROSURGERY | Facility: CLINIC | Age: 70
End: 2020-01-03

## 2020-01-03 NOTE — TELEPHONE ENCOUNTER
----- Message from Mao Moore sent at 1/3/2020  9:14 AM CST -----  Contact: pt @ [phone]  Pt is asking if she can move appt on 03/02/20 to the end of February

## 2020-01-03 NOTE — TELEPHONE ENCOUNTER
Patient has been informed that Dr. Patel has no sooner appointments. Patient was informed that she will be placed on the cancellation list if a sooner appointment come available.

## 2020-03-05 ENCOUNTER — TELEPHONE (OUTPATIENT)
Dept: NEUROSURGERY | Facility: CLINIC | Age: 70
End: 2020-03-05

## 2020-03-05 DIAGNOSIS — S33.5XXS LUMBAR SPRAIN, SEQUELA: Primary | ICD-10-CM

## 2020-03-10 ENCOUNTER — TELEPHONE (OUTPATIENT)
Dept: NEUROSURGERY | Facility: CLINIC | Age: 70
End: 2020-03-10

## 2020-03-10 NOTE — TELEPHONE ENCOUNTER
----- Message from Elsy Washington MA sent at 3/5/2020  3:44 PM CST -----  Contact: Carlene (Dr. Mcconnell's office) @ [phone]      ----- Message -----  From: Cynthia Cam  Sent: 3/5/2020   9:52 AM CST  To: Amanda Mcallister Staff    Calling to speak with someone in Dr. Patel's office regarding what's needed for the patient's clearance. Please call, says patient is there now.

## 2020-05-13 ENCOUNTER — TELEPHONE (OUTPATIENT)
Dept: NEUROSURGERY | Facility: CLINIC | Age: 70
End: 2020-05-13

## 2020-05-13 NOTE — TELEPHONE ENCOUNTER
----- Message from Caitie Rm sent at 5/13/2020  9:58 AM CDT -----  Good morning,    Dr. Juan Manuel Mcconnell referring established patient for withintrathecal pain pump, patient suffered a fall causing catheter to come out, requesting repair or replacement. Please call patient. Referral & records scanned into media mgr.    Let me know if I can be of any further assistance,    Caitie Morris

## 2020-05-14 ENCOUNTER — PATIENT MESSAGE (OUTPATIENT)
Dept: NEUROSURGERY | Facility: CLINIC | Age: 70
End: 2020-05-14

## 2020-05-15 ENCOUNTER — OFFICE VISIT (OUTPATIENT)
Dept: NEUROSURGERY | Facility: CLINIC | Age: 70
End: 2020-05-15
Payer: MEDICARE

## 2020-05-15 DIAGNOSIS — G89.4 CHRONIC PAIN SYNDROME: Primary | ICD-10-CM

## 2020-05-15 PROCEDURE — 99211 OFF/OP EST MAY X REQ PHY/QHP: CPT

## 2020-05-15 PROCEDURE — 99214 OFFICE O/P EST MOD 30 MIN: CPT | Mod: 95,,, | Performed by: NEUROLOGICAL SURGERY

## 2020-05-15 PROCEDURE — G0463 HOSPITAL OUTPT CLINIC VISIT: HCPCS

## 2020-05-15 PROCEDURE — 99214 PR OFFICE/OUTPT VISIT, EST, LEVL IV, 30-39 MIN: ICD-10-PCS | Mod: 95,,, | Performed by: NEUROLOGICAL SURGERY

## 2020-05-15 NOTE — PROGRESS NOTES
Neurosurgery  Established Patient    SUBJECTIVE:     History of Present Illness:  Ms. Mccabe is a 68-year-old female patient of Dr. Patel who is seeing me today in follow-up. She was taken to the operating room on June 18, 2019 for replacement of an intrathecal catheter as well as opioid pump.  Preoperatively, she complained of a longstanding history of low back pain and leg pain.  She had a lumbar fusion as well as multiple epidural steroid injections which did not help her pain.  Ultimately, she had an intrathecal catheter as well as intrathecal opioid pump placed for pain relief.  She did well for years.  In July 2016, her Medtronic pump was replaced with a Flowonix pump.  The patient stated that since that time she had never gotten great pain relief.  Furthermore, in October 2018 she began noticing withdrawal symptoms.  After withdrawal symptoms passed, she noticed that she was no longer getting relief from the pain pump.  It was thought that there is a catheter failure.  Therefore, the decision was made to take the patient to the operating room for intrathecal catheter as well as opioid pump replacement with a Medtronic pump. At the time of her last follow-up she reported no problems or complications postoperatively with the pump.  The pump repositioning seemed to be much better for the patient.  She was seeing a pain management physician in [place] who had her on a very low dose of intrathecal Dilaudid.  The patient reports that this was not significantly relieving her pain.  Therefore, Dr. Patel referred her to a pain management physician in [place].  She also complained of a progressive left foot drop at the time of her last appointment.  Dr. Patel had sent the patient for an MRI of the lumbar spine.  The patient is now seeing Dr. Sifuentes for pain management.     As of 5/15, patient reports that her pump has now flipped over.  Dr. Sifuentes was unable to access the pump on February 27th.  She reports that  "she has had issues with access to care given the current pandemic.  She has recently been living in [place], but she is planning to return to the [place] area to stay with her sister soon.  She reports that she had x-rays taken recently in [place] that demonstrate the pump is flipped; however, these are not immediately available for my review.  She also states that she received notification from the manufacture of her current intrathecal pump, Medtronic, that her model has been recalled.  She has had no withdrawal symptoms.      Review of patient's allergies indicates:   Allergen Reactions    Morphine Anaphylaxis    Versed [midazolam] Hallucinations     "went crazy"    Codeine Hives and Rash       Current Outpatient Medications   Medication Sig Dispense Refill    albuterol (VENTOLIN HFA) 90 mcg/actuation inhaler Ventolin HFA 90 mcg/actuation aerosol inhaler      amitriptyline (ELAVIL) 50 MG tablet Take 50 mg by mouth once daily.  5    atorvastatin (LIPITOR) 20 MG tablet atorvastatin 20 mg tablet      azelastine (ASTELIN) 137 mcg (0.1 %) nasal spray azelastine 137 mcg (0.1 %) nasal spray aerosol      cyanocobalamin (VITAMIN B-12) 100 MCG tablet Take 100 mcg by mouth once daily.      fluocinonide 0.05% (LIDEX) 0.05 % cream fluocinonide 0.05 % topical cream      fluticasone propionate (FLONASE) 50 mcg/actuation nasal spray fluticasone propionate 50 mcg/actuation nasal spray,suspension      furosemide (LASIX) 20 MG tablet Take 20 mg by mouth once daily.       ketoconazole (NIZORAL) 2 % cream ketoconazole 2 % topical cream      levothyroxine (SYNTHROID) 100 MCG tablet levothyroxine 100 mcg tablet      lisinopril 10 MG tablet lisinopril 10 mg tablet      LUTEIN ORAL Take by mouth.      methylPREDNISolone (MEDROL DOSEPACK) 4 mg tablet TAKE 6 TABLETS ON DAY 1 AS DIRECTED ON PACKAGE AND DECREASE BY 1 TAB EACH DAY FOR A TOTAL OF 6 DAYS  0    metoprolol succinate (TOPROL-XL) 50 MG 24 hr tablet metoprolol " succinate ER 50 mg tablet,extended release 24 hr      metroNIDAZOLE (NORITATE) 1 % cream metronidazole 1 % topical gel      metronidazole 1% (METROGEL) 1 % Gel metronidazole 1 % topical gel      mirabegron (MYRBETRIQ) 50 mg Tb24 Myrbetriq 50 mg tablet,extended release      mirtazapine (REMERON) 15 MG tablet mirtazapine 15 mg tablet      mirtazapine (REMERON) 15 MG tablet mirtazapine 15 mg tablet      montelukast (SINGULAIR) 10 mg tablet montelukast 10 mg tablet      omega-3/dha/epa/dpa/fish oil (OMEGA-3 2100 ORAL) Take by mouth once daily.      oxybutynin (DITROPAN-XL) 10 MG 24 hr tablet oxybutynin chloride ER 10 mg tablet,extended release 24 hr      oxyCODONE-acetaminophen (PERCOCET)  mg per tablet oxycodone-acetaminophen 10 mg-325 mg tablet      pantoprazole (PROTONIX) 40 MG tablet pantoprazole 40 mg tablet,delayed release      phenazopyridine (PYRIDIUM) 200 MG tablet phenazopyridine 200 mg tablet      RESTASIS 0.05 % ophthalmic emulsion Place 1 drop into both eyes 2 (two) times daily.  3    rizatriptan (MAXALT-MLT) 10 MG disintegrating tablet rizatriptan 10 mg disintegrating tablet      sulfamethoxazole-trimethoprim 800-160mg (BACTRIM DS) 800-160 mg Tab Take 1 tablet by mouth 2 (two) times daily.  0     No current facility-administered medications for this visit.        Past Medical History:   Diagnosis Date    Arthritis     Asthma     Encounter for blood transfusion     Hyperlipidemia     Hypertension     Thyroid disease      Past Surgical History:   Procedure Laterality Date    CHOLECYSTECTOMY      GASTRIC RESTRICTION SURGERY      HYSTERECTOMY      INTRATHECAL PUMP IMPLANTATION N/A 6/18/2019    Procedure: Insertion, Intrathecal Pump;  Surgeon: Ary Patel MD;  Location: [facility] OR [address] [place] [address];  Service: Neurosurgery;  Laterality: N/A;  1.5HOUR/23HOUR STAY/TYPE/HOLD/REGULAR BED SLIDERS/BEANBAG/LATERAL RIGHT DOWN/C-ARM/MEDTRONICS    KNEE SURGERY Right     neck fusion       SPINE  SURGERY      TONSILLECTOMY       Family History     None        Social History     Socioeconomic History    Marital status:      Spouse name: Not on file    Number of children: Not on file    Years of education: Not on file    Highest education level: Not on file   Occupational History    Not on file   Social Needs    Financial resource strain: Not on file    Food insecurity:     Worry: Not on file     Inability: Not on file    Transportation needs:     Medical: Not on file     Non-medical: Not on file   Tobacco Use    Smoking status: Never Smoker    Smokeless tobacco: Never Used   Substance and Sexual Activity    Alcohol use: Never     Frequency: Never    Drug use: Never    Sexual activity: Not Currently   Lifestyle    Physical activity:     Days per week: Not on file     Minutes per session: Not on file    Stress: Not on file   Relationships    Social connections:     Talks on phone: Not on file     Gets together: Not on file     Attends Scientologist service: Not on file     Active member of club or organization: Not on file     Attends meetings of clubs or organizations: Not on file     Relationship status: Not on file   Other Topics Concern    Not on file   Social History Narrative    Not on file       Review of Systems   Constitutional: Positive for diaphoresis.   HENT: Negative for nosebleeds.    Eyes: Positive for visual disturbance.        Cataracts   Respiratory: Positive for shortness of breath.    Cardiovascular: Negative for chest pain.   Gastrointestinal: Negative for constipation and diarrhea.   Endocrine: Negative for cold intolerance.   Genitourinary: Negative for difficulty urinating.   Musculoskeletal: Positive for back pain.   Skin: Negative for color change.   Neurological: Positive for weakness.   Hematological: Bruises/bleeds easily.   Psychiatric/Behavioral: The patient is not nervous/anxious.      Answers for HPI/ROS submitted by the patient on 5/14/2020   sweating:  Yes  shortness of breath: Yes    OBJECTIVE:     Vital Signs     There is no height or weight on file to calculate BMI.    Physical Exam:    Constitutional: She appears well-developed and well-nourished.     Eyes: EOM are normal.     Skin: Skin displays no rash on extremities. Skin displays no lesions on extremities.   Left side incision well healed      Psych/Behavior: She is alert. She is oriented to person, place, and time.     Musculoskeletal:   Walks with walker      Neurological:        Coordination: She has normal finger to nose coordination.        Cranial nerves:   Face symmetric, tongue midline, shoulder shrug equal bilaterally     Diagnostic Results:  None available    ASSESSMENT/PLAN:     Karen Mccabe is a 69 y.o. female status post intrathecal pain pump placement who is up could not be refilled on February 27th.  Given the constraints of a video visit (thus I am unable to interrogate the device directly), I would like to begin by obtaining AP and lateral x-rays of the abdomen to evaluate objectively the pumps position.    I would also like the patient to begin the medical optimization process.  I would like her to arrange video visit with Dr. Arevalo.  I will plan to see her back after the x-rays have been obtained to discuss the next steps in treatment and management.    The patient location is: at home  The chief complaint leading to consultation is:  Query intrathecal pump malfunction     Visit type: audiovisual    Face to Face time with patient: 30 minutes   50 minutes of total time spent on the encounter, which includes face to face time and non-face to face time preparing to see the patient (eg, review of tests), Obtaining and/or reviewing separately obtained history, Documenting clinical information in the electronic or other health record, Independently interpreting results (not separately reported) and communicating results to the patient/family/caregiver, or Care coordination (not  separately reported).     Each patient to whom he or she provides medical services by telemedicine is:  (1) informed of the relationship between the physician and patient and the respective role of any other health care provider with respect to management of the patient; and (2) notified that he or she may decline to receive medical services by telemedicine and may withdraw from such care at any time.    Notes: as above       Note generated with voice recognition software; please excuse any typographical errors.

## 2020-05-19 ENCOUNTER — TELEPHONE (OUTPATIENT)
Dept: NEUROSURGERY | Facility: CLINIC | Age: 70
End: 2020-05-19

## 2020-05-19 DIAGNOSIS — M96.1 LUMBAR POST-LAMINECTOMY SYNDROME: Primary | ICD-10-CM

## 2020-05-21 ENCOUNTER — HOSPITAL ENCOUNTER (OUTPATIENT)
Dept: RADIOLOGY | Facility: HOSPITAL | Age: 70
Discharge: HOME | End: 2020-05-21
Attending: NEUROLOGICAL SURGERY
Payer: MEDICARE

## 2020-05-21 DIAGNOSIS — M96.1 LUMBAR POST-LAMINECTOMY SYNDROME: ICD-10-CM

## 2020-05-21 PROCEDURE — 74019 RADEX ABDOMEN 2 VIEWS: CPT | Mod: 26,,, | Performed by: RADIOLOGY

## 2020-05-21 PROCEDURE — 74019 RADEX ABDOMEN 2 VIEWS: CPT | Mod: TC,FY

## 2020-05-21 PROCEDURE — 74019 XR ABDOMEN FLAT AND ERECT: ICD-10-PCS | Mod: 26,,, | Performed by: RADIOLOGY

## 2020-05-27 ENCOUNTER — PATIENT MESSAGE (OUTPATIENT)
Dept: NEUROSURGERY | Facility: CLINIC | Age: 70
End: 2020-05-27

## 2020-05-29 ENCOUNTER — TELEPHONE (OUTPATIENT)
Dept: NEUROSURGERY | Facility: CLINIC | Age: 70
End: 2020-05-29

## 2020-05-29 NOTE — TELEPHONE ENCOUNTER
I returned the pt call and informed her that I have sent a message to 's office requesting a virtual visit. Once  has reviewed her imaging, she will give direction on her care plan. The pt agreed to this and will wait to hear back from staff.  ----- Message from Stephanie Cleveland sent at 5/29/2020  2:02 PM CDT -----  Contact: VISHAL DOVER [[id number]]  Type: Patient Call Back    Who called: VISHAL DOVER [[id number]]    What is the request in detail: Patient is requesting a call back. She states that she left a message on the patient portal and no one has replied.   Please advise.    Can the clinic reply by [facility]? No    Best call back number: [phone]    Additional Information: N/A

## (undated) DEVICE — NDL SPINAL 18GX3.5 SPINOCAN

## (undated) DEVICE — GAUZE SPONGE 4X4 12PLY

## (undated) DEVICE — SUT VICRYL+ 2-0 SH 18IN

## (undated) DEVICE — DRAPE C-ARMOR EQUIPMENT COVER

## (undated) DEVICE — SEE MEDLINE ITEM 157150

## (undated) DEVICE — CATH PASSER FOR MORPHINE PUMP

## (undated) DEVICE — DRAPE INCISE IOBAN 2 23X17IN

## (undated) DEVICE — TUBE FRAZIER 5MM 2FT SOFT TIP

## (undated) DEVICE — ELECTRODE REM PLYHSV RETURN 9

## (undated) DEVICE — NDL HYPO REG 25G X 1 1/2

## (undated) DEVICE — SUT VICRYL PLUS 2-0

## (undated) DEVICE — DRAPE C ARM 42 X 120 10/BX

## (undated) DEVICE — SUT VICRYL PLUS 0 CT1 18IN

## (undated) DEVICE — DRAPE STERI INSTRUMENT 1018

## (undated) DEVICE — SEE MEDLINE ITEM 154981

## (undated) DEVICE — STAPLER SKIN PROXIMATE WIDE

## (undated) DEVICE — TAPE SILK 3IN

## (undated) DEVICE — ADHESIVE MASTISOL VIAL 48/BX

## (undated) DEVICE — CHLORAPREP 10.5 ML APPLICATOR

## (undated) DEVICE — DURAPREP SURG SCRUB 26ML

## (undated) DEVICE — SUT VICRYL PLUS 3-0 SH 18IN

## (undated) DEVICE — SEE MEDLINE ITEM 157131

## (undated) DEVICE — SUT MCRYL PLUS 4-0 PS2 27IN

## (undated) DEVICE — CORD BIPOLAR 12 FOOT

## (undated) DEVICE — DRESSING TEGADERM 2X2 3/4

## (undated) DEVICE — SYR ONLY LUER LOCK 20CC

## (undated) DEVICE — DRESSING TRANS 4X4 TEGADERM

## (undated) DEVICE — TAPE MEDIPORE 4IN X 2YDS

## (undated) DEVICE — SUT SILK 2-0 SH 18IN BLACK

## (undated) DEVICE — KIT POWDER ABSORBABLE GELATIN

## (undated) DEVICE — SUT 4/0 18IN NUROLON BLK B

## (undated) DEVICE — CLOSURE SKIN STERI STRIP 1/2X4

## (undated) DEVICE — DRESSING TELFA N ADH 4X10

## (undated) DEVICE — SEE MEDLINE ITEM 156905

## (undated) DEVICE — DRAPE STERI-DRAPE 1000 17X11IN

## (undated) DEVICE — SYR 3CC LUER LOC

## (undated) DEVICE — SKIN MARKER DEVON 160

## (undated) DEVICE — ELECTRODE BLADE INSULATED 1 IN

## (undated) DEVICE — DRAPE LAP TIBURON 77X122IN